# Patient Record
Sex: FEMALE | Race: BLACK OR AFRICAN AMERICAN | NOT HISPANIC OR LATINO | Employment: UNEMPLOYED | URBAN - METROPOLITAN AREA
[De-identification: names, ages, dates, MRNs, and addresses within clinical notes are randomized per-mention and may not be internally consistent; named-entity substitution may affect disease eponyms.]

---

## 2021-05-14 ENCOUNTER — IMMUNIZATION (OUTPATIENT)
Dept: PRIMARY CARE CLINIC | Facility: CLINIC | Age: 31
End: 2021-05-14

## 2021-05-14 DIAGNOSIS — Z23 NEED FOR VACCINATION: Primary | ICD-10-CM

## 2021-05-14 PROCEDURE — 0001A COVID-19, MRNA, LNP-S, PF, 30 MCG/0.3 ML DOSE VACCINE: ICD-10-PCS | Mod: CV19,S$GLB,, | Performed by: INTERNAL MEDICINE

## 2021-05-14 PROCEDURE — 0001A COVID-19, MRNA, LNP-S, PF, 30 MCG/0.3 ML DOSE VACCINE: CPT | Mod: CV19,S$GLB,, | Performed by: INTERNAL MEDICINE

## 2021-05-14 PROCEDURE — 91300 COVID-19, MRNA, LNP-S, PF, 30 MCG/0.3 ML DOSE VACCINE: CPT | Mod: S$GLB,,, | Performed by: INTERNAL MEDICINE

## 2021-05-14 PROCEDURE — 91300 COVID-19, MRNA, LNP-S, PF, 30 MCG/0.3 ML DOSE VACCINE: ICD-10-PCS | Mod: S$GLB,,, | Performed by: INTERNAL MEDICINE

## 2021-06-04 ENCOUNTER — IMMUNIZATION (OUTPATIENT)
Dept: PRIMARY CARE CLINIC | Facility: CLINIC | Age: 31
End: 2021-06-04

## 2021-06-04 DIAGNOSIS — Z23 NEED FOR VACCINATION: Primary | ICD-10-CM

## 2021-06-04 PROCEDURE — 0002A COVID-19, MRNA, LNP-S, PF, 30 MCG/0.3 ML DOSE VACCINE: ICD-10-PCS | Mod: CV19,S$GLB,, | Performed by: INTERNAL MEDICINE

## 2021-06-04 PROCEDURE — 91300 COVID-19, MRNA, LNP-S, PF, 30 MCG/0.3 ML DOSE VACCINE: CPT | Mod: S$GLB,,, | Performed by: INTERNAL MEDICINE

## 2021-06-04 PROCEDURE — 91300 COVID-19, MRNA, LNP-S, PF, 30 MCG/0.3 ML DOSE VACCINE: ICD-10-PCS | Mod: S$GLB,,, | Performed by: INTERNAL MEDICINE

## 2021-06-04 PROCEDURE — 0002A COVID-19, MRNA, LNP-S, PF, 30 MCG/0.3 ML DOSE VACCINE: CPT | Mod: CV19,S$GLB,, | Performed by: INTERNAL MEDICINE

## 2022-04-05 ENCOUNTER — HOSPITAL ENCOUNTER (EMERGENCY)
Facility: HOSPITAL | Age: 32
Discharge: HOME | End: 2022-04-05
Attending: EMERGENCY MEDICINE
Payer: MEDICAID

## 2022-04-05 VITALS
HEIGHT: 67 IN | WEIGHT: 138 LBS | RESPIRATION RATE: 16 BRPM | BODY MASS INDEX: 21.66 KG/M2 | TEMPERATURE: 99 F | SYSTOLIC BLOOD PRESSURE: 142 MMHG | DIASTOLIC BLOOD PRESSURE: 97 MMHG | HEART RATE: 94 BPM | OXYGEN SATURATION: 100 %

## 2022-04-05 DIAGNOSIS — S62.664A CLOSED NONDISPLACED FRACTURE OF DISTAL PHALANX OF RIGHT RING FINGER, INITIAL ENCOUNTER: ICD-10-CM

## 2022-04-05 DIAGNOSIS — S69.91XA INJURY OF FINGER OF RIGHT HAND, INITIAL ENCOUNTER: Primary | ICD-10-CM

## 2022-04-05 LAB
B-HCG UR QL: NEGATIVE
CTP QC/QA: YES

## 2022-04-05 PROCEDURE — 25000003 PHARM REV CODE 250: Performed by: PHYSICIAN ASSISTANT

## 2022-04-05 PROCEDURE — 99284 EMERGENCY DEPT VISIT MOD MDM: CPT | Mod: 25

## 2022-04-05 PROCEDURE — 81025 URINE PREGNANCY TEST: CPT | Performed by: EMERGENCY MEDICINE

## 2022-04-05 RX ORDER — SULFAMETHOXAZOLE AND TRIMETHOPRIM 800; 160 MG/1; MG/1
1 TABLET ORAL
Status: COMPLETED | OUTPATIENT
Start: 2022-04-05 | End: 2022-04-05

## 2022-04-05 RX ORDER — IBUPROFEN 800 MG/1
800 TABLET ORAL 3 TIMES DAILY
Qty: 20 TABLET | Refills: 0 | Status: SHIPPED | OUTPATIENT
Start: 2022-04-05

## 2022-04-05 RX ORDER — SULFAMETHOXAZOLE AND TRIMETHOPRIM 800; 160 MG/1; MG/1
1 TABLET ORAL 2 TIMES DAILY
Qty: 14 TABLET | Refills: 0 | Status: SHIPPED | OUTPATIENT
Start: 2022-04-05 | End: 2022-04-12

## 2022-04-05 RX ADMIN — SULFAMETHOXAZOLE AND TRIMETHOPRIM 1 TABLET: 800; 160 TABLET ORAL at 01:04

## 2022-04-05 NOTE — DISCHARGE INSTRUCTIONS
Take antibiotics until complete  Use ibuprofen as needed for pain  Follow up with Orthopedic Hand surgery  Return to the ED as needed  Keep the finger splint on the finger for the next couple of weeks    Thank you for coming to our Emergency Department today. It is important to remember that some problems are difficult to diagnose and may not be found during your Emergency Department visit. Be sure to follow up with your primary care doctor and review all labs/imaging/tests that were performed during this visit with them. Some labs/tests may be outside of the normal range and require non-emergent follow-up and further investigation to help diagnose/exclude/prevent complications or other medical conditions.    If you do not have a primary care doctor, you may contact the one listed on your discharge paperwork or you may also call the [facility] Appointment Desk at [phone] to schedule an appointment and establish care with one. It is important to your health that you have a primary care doctor.    Please take all medications as directed. All medications may potentially have side-effects and it is impossible to predict which medications may give you side-effects or what side-effects (if any) they will give you.. If you feel that you are having a negative effect or side-effect of any medication you should immediately stop taking them and seek medical attention. If you feel that you are having a life-threatening reaction call 911.    Return to the ER with any questions/concerns, new/concerning symptoms, worsening or failure to improve.     Do not drive, swim, climb to height, take a bath or make any important decisions for 24 hours if you have received any pain medications, sedatives or mood altering drugs during your ER visit.      Thank you for coming to our Emergency Department today. It is important to remember that some problems are difficult to diagnose and may not be found during your Emergency  Department visit. Be sure to follow up with your primary care doctor and review all labs/imaging/tests that were performed during this visit with them. Some labs/tests may be outside of the normal range and require non-emergent follow-up and further investigation to help diagnose/exclude/prevent complications or other medical conditions.    If you do not have a primary care doctor, you may contact the one listed on your discharge paperwork or you may also call the [facility] Appointment Desk at [phone] to schedule an appointment and establish care with one. It is important to your health that you have a primary care doctor.    Please take all medications as directed. All medications may potentially have side-effects and it is impossible to predict which medications may give you side-effects or what side-effects (if any) they will give you.. If you feel that you are having a negative effect or side-effect of any medication you should immediately stop taking them and seek medical attention. If you feel that you are having a life-threatening reaction call 911.    Return to the ER with any questions/concerns, new/concerning symptoms, worsening or failure to improve.     Do not drive, swim, climb to height, take a bath or make any important decisions for 24 hours if you have received any pain medications, sedatives or mood altering drugs during your ER visit.
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
## 2022-07-09 ENCOUNTER — HOSPITAL ENCOUNTER (EMERGENCY)
Facility: HOSPITAL | Age: 32
Discharge: HOME | End: 2022-07-09
Attending: EMERGENCY MEDICINE
Payer: MEDICAID

## 2022-07-09 VITALS
HEART RATE: 78 BPM | HEIGHT: 66 IN | OXYGEN SATURATION: 99 % | BODY MASS INDEX: 22.5 KG/M2 | RESPIRATION RATE: 20 BRPM | TEMPERATURE: 99 F | DIASTOLIC BLOOD PRESSURE: 85 MMHG | SYSTOLIC BLOOD PRESSURE: 132 MMHG | WEIGHT: 140 LBS

## 2022-07-09 DIAGNOSIS — J02.9 PHARYNGITIS, UNSPECIFIED ETIOLOGY: Primary | ICD-10-CM

## 2022-07-09 DIAGNOSIS — T17.908A FOREIGN BODY ASPIRATION: ICD-10-CM

## 2022-07-09 LAB
B-HCG UR QL: NEGATIVE
CTP QC/QA: YES

## 2022-07-09 PROCEDURE — 81025 URINE PREGNANCY TEST: CPT | Performed by: EMERGENCY MEDICINE

## 2022-07-09 PROCEDURE — 99284 EMERGENCY DEPT VISIT MOD MDM: CPT | Mod: 25

## 2022-07-09 PROCEDURE — 25000003 PHARM REV CODE 250: Performed by: EMERGENCY MEDICINE

## 2022-07-09 RX ORDER — SUCRALFATE 1 G/10ML
1 SUSPENSION ORAL 4 TIMES DAILY
Qty: 420 ML | Refills: 0 | Status: SHIPPED | OUTPATIENT
Start: 2022-07-09

## 2022-07-09 RX ORDER — MAG HYDROX/ALUMINUM HYD/SIMETH 200-200-20
30 SUSPENSION, ORAL (FINAL DOSE FORM) ORAL ONCE
Status: COMPLETED | OUTPATIENT
Start: 2022-07-09 | End: 2022-07-09

## 2022-07-09 RX ORDER — LIDOCAINE HYDROCHLORIDE 20 MG/ML
10 SOLUTION OROPHARYNGEAL ONCE
Status: COMPLETED | OUTPATIENT
Start: 2022-07-09 | End: 2022-07-09

## 2022-07-09 RX ADMIN — ALUMINUM HYDROXIDE, MAGNESIUM HYDROXIDE, AND SIMETHICONE 30 ML: 200; 200; 20 SUSPENSION ORAL at 09:07

## 2022-07-09 RX ADMIN — LIDOCAINE HYDROCHLORIDE 10 ML: 20 SOLUTION ORAL; TOPICAL at 09:07
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
## 2023-01-01 ENCOUNTER — HOSPITAL ENCOUNTER (EMERGENCY)
Facility: HOSPITAL | Age: 33
Discharge: HOME | End: 2023-01-01
Attending: EMERGENCY MEDICINE | Admitting: EMERGENCY MEDICINE
Payer: MEDICAID

## 2023-01-01 VITALS
TEMPERATURE: 98 F | HEIGHT: 66 IN | DIASTOLIC BLOOD PRESSURE: 82 MMHG | SYSTOLIC BLOOD PRESSURE: 134 MMHG | RESPIRATION RATE: 18 BRPM | BODY MASS INDEX: 22.18 KG/M2 | HEART RATE: 71 BPM | WEIGHT: 138 LBS | OXYGEN SATURATION: 99 %

## 2023-01-01 DIAGNOSIS — S16.1XXA CERVICAL STRAIN, ACUTE, INITIAL ENCOUNTER: ICD-10-CM

## 2023-01-01 DIAGNOSIS — S01.81XA LACERATION OF MULTIPLE SITES OF FACE: ICD-10-CM

## 2023-01-01 DIAGNOSIS — S02.30XA ORBITAL FLOOR (BLOW-OUT) CLOSED FRACTURE: Primary | ICD-10-CM

## 2023-01-01 DIAGNOSIS — S09.90XA CLOSED HEAD INJURY: ICD-10-CM

## 2023-01-01 LAB
B-HCG UR QL: NEGATIVE
CTP QC/QA: YES

## 2023-01-01 PROCEDURE — 81025 URINE PREGNANCY TEST: CPT | Performed by: EMERGENCY MEDICINE

## 2023-01-01 PROCEDURE — 25000003 PHARM REV CODE 250: Performed by: EMERGENCY MEDICINE

## 2023-01-01 PROCEDURE — 63600175 PHARM REV CODE 636 W HCPCS: Performed by: EMERGENCY MEDICINE

## 2023-01-01 PROCEDURE — 96365 THER/PROPH/DIAG IV INF INIT: CPT

## 2023-01-01 PROCEDURE — 96375 TX/PRO/DX INJ NEW DRUG ADDON: CPT

## 2023-01-01 PROCEDURE — 99285 EMERGENCY DEPT VISIT HI MDM: CPT | Mod: 25

## 2023-01-01 RX ORDER — ONDANSETRON 4 MG/1
4 TABLET, ORALLY DISINTEGRATING ORAL
Status: COMPLETED | OUTPATIENT
Start: 2023-01-01 | End: 2023-01-01

## 2023-01-01 RX ORDER — OXYCODONE AND ACETAMINOPHEN 5; 325 MG/1; MG/1
1 TABLET ORAL EVERY 6 HOURS PRN
Qty: 12 TABLET | Refills: 0 | Status: SHIPPED | OUTPATIENT
Start: 2023-01-01

## 2023-01-01 RX ORDER — OXYMETAZOLINE HCL 0.05 %
1 SPRAY, NON-AEROSOL (ML) NASAL 2 TIMES DAILY
Qty: 15 ML | Refills: 0 | Status: SHIPPED | OUTPATIENT
Start: 2023-01-01 | End: 2023-01-04

## 2023-01-01 RX ORDER — OXYCODONE HYDROCHLORIDE 5 MG/1
5 TABLET ORAL
Status: COMPLETED | OUTPATIENT
Start: 2023-01-01 | End: 2023-01-01

## 2023-01-01 RX ORDER — HYDROCODONE BITARTRATE AND ACETAMINOPHEN 5; 325 MG/1; MG/1
1 TABLET ORAL
Status: COMPLETED | OUTPATIENT
Start: 2023-01-01 | End: 2023-01-01

## 2023-01-01 RX ORDER — AMOXICILLIN AND CLAVULANATE POTASSIUM 875; 125 MG/1; MG/1
1 TABLET, FILM COATED ORAL 2 TIMES DAILY
Qty: 14 TABLET | Refills: 0 | Status: SHIPPED | OUTPATIENT
Start: 2023-01-01

## 2023-01-01 RX ORDER — ERYTHROMYCIN 5 MG/G
OINTMENT OPHTHALMIC
Qty: 3.5 G | Refills: 0 | Status: SHIPPED | OUTPATIENT
Start: 2023-01-01

## 2023-01-01 RX ORDER — CEFAZOLIN SODIUM 1 G/50ML
1 SOLUTION INTRAVENOUS
Status: DISCONTINUED | OUTPATIENT
Start: 2023-01-01 | End: 2023-01-01 | Stop reason: HOSPADM

## 2023-01-01 RX ORDER — HYDROMORPHONE HYDROCHLORIDE 1 MG/ML
1 INJECTION, SOLUTION INTRAMUSCULAR; INTRAVENOUS; SUBCUTANEOUS
Status: COMPLETED | OUTPATIENT
Start: 2023-01-01 | End: 2023-01-01

## 2023-01-01 RX ADMIN — HYDROCODONE BITARTRATE AND ACETAMINOPHEN 1 TABLET: 5; 325 TABLET ORAL at 08:01

## 2023-01-01 RX ADMIN — BACITRACIN ZINC, NEOMYCIN, POLYMYXIN B 1 EACH: 400; 3.5; 5 OINTMENT TOPICAL at 10:01

## 2023-01-01 RX ADMIN — CEFAZOLIN SODIUM 1 G: 1 SOLUTION INTRAVENOUS at 11:01

## 2023-01-01 RX ADMIN — OXYCODONE 5 MG: 5 TABLET ORAL at 03:01

## 2023-01-01 RX ADMIN — HYDROMORPHONE HYDROCHLORIDE 1 MG: 1 INJECTION, SOLUTION INTRAMUSCULAR; INTRAVENOUS; SUBCUTANEOUS at 12:01

## 2023-01-01 RX ADMIN — ONDANSETRON 4 MG: 4 TABLET, ORALLY DISINTEGRATING ORAL at 12:01

## 2023-01-01 NOTE — DISCHARGE INSTRUCTIONS
We recommend that you not blow your nose, do any heavy lifting bending or straining until you are cleared to do so by Ophthalmology  Please complete the entire course of antibiotics as prescribed:  Augmentin 875/125mg by mouth twice daily for 7 days   we recommend you use Afrin spray in both nostrils 2-3 times daily  for 3 days  We recommend you apply erythromycin antibiotic ointment to the small scrape underneath your left eye 3 times daily to help prevent infection and you may use it in your left eye as he would like for comfort  You may  apply ice packs to eyelids for 20 minutes every 1-2 hours for the first 24-48 hours   we recommend you have your head upright at a 30 degree angle whenever you are resting or sleeping  We recommend you avoid medications such as ibuprofen, Advil or aspirin.   You may use Percocet, 1-2 tabs every 6 hours as needed for pain.  Do not drink alcohol drive or operate heavy machinery while using Percocet.  Please know that Percocet does include Tylenol so do not take extra Tylenol while using Percocet.     Follow up with oculoplastics within the week - they should contact you at home for an appointment but you have been provided with their phone number if you do not hear from them this week    Return to the ED immediately for any new chest pain, shortness of breath, severe abdominal pain, abdominal pain that is constant, nausea/and vomiting that does not stop on its own, severe headache, new numbness, tingling, or weakness anywhere, fever greater than 101F or any additional concerns.
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
## 2023-01-01 NOTE — ED NOTES
LOC: The patient is awake, drowsy, and oriented to self, place, time, and situation. Pt is calm and cooperative. Affect is appropriate.  Speech is appropriate and clear.     APPEARANCE: Patient resting comfortably in no acute distress.  Patient is clean and well groomed.    SKIN: The skin is warm and dry; color consistent with ethnicity.  Patient has normal skin turgor and moist mucus membranes.  Edema noted to left eye, left side of upper and lower lip. Abrasion noted to right ear.     MUSCULOSKELETAL: Patient moving upper and lower extremities without difficulty; denies pain in the extremities or back.  Denies weakness.     RESPIRATORY: Airway is open and patent. Respirations spontaneous, even, easy, and non-labored.  Patient has a normal effort and rate.  No accessory muscle use noted. Denies cough.     CARDIAC:  Normal rate noted.  No peripheral edema noted. No complaints of chest pain.      ABDOMEN: Soft and non tender to palpation.  No distention noted. Pt denies abdominal pain; denies nausea, vomiting, diarrhea, or constipation.    NEUROLOGIC: Eye open spontaneously.  Behavior appropriate to situation.  Follows commands; facial expression symmetrical.  Purposeful motor response noted; normal sensation in all extremities. Pt denies headache; denies lightheadedness or dizziness; denies visual disturbances; denies loss of balance; denies unilateral weakness.PERRLA intact with appx 3mm pupils bilaterally.

## 2023-01-03 ENCOUNTER — TELEPHONE (OUTPATIENT)
Dept: OPHTHALMOLOGY | Facility: CLINIC | Age: 33
End: 2023-01-03
Payer: MEDICAID

## 2023-01-03 NOTE — TELEPHONE ENCOUNTER
----- Message from Dean Salazar MD sent at 1/1/2023  3:53 PM CST -----  Regarding: Follow up  Hi,    Can we please get this patient follow up in [facility]'s clinic at the end of this week or early next week for follow up of orbital fracture.    Thanks!    Best Contact: [phone]    Dean Salazar MD  [facility], PGY-2  Pager: [phone]

## 2023-02-05 ENCOUNTER — HOSPITAL ENCOUNTER (EMERGENCY)
Facility: HOSPITAL | Age: 33
Discharge: HOME | End: 2023-02-05
Attending: EMERGENCY MEDICINE
Payer: MEDICAID

## 2023-02-05 VITALS
HEIGHT: 67 IN | OXYGEN SATURATION: 95 % | RESPIRATION RATE: 16 BRPM | SYSTOLIC BLOOD PRESSURE: 111 MMHG | HEART RATE: 102 BPM | TEMPERATURE: 98 F | DIASTOLIC BLOOD PRESSURE: 76 MMHG | WEIGHT: 138 LBS | BODY MASS INDEX: 21.66 KG/M2

## 2023-02-05 DIAGNOSIS — B96.89 BV (BACTERIAL VAGINOSIS): ICD-10-CM

## 2023-02-05 DIAGNOSIS — N39.0 URINARY TRACT INFECTION WITHOUT HEMATURIA, SITE UNSPECIFIED: Primary | ICD-10-CM

## 2023-02-05 DIAGNOSIS — N76.0 BV (BACTERIAL VAGINOSIS): ICD-10-CM

## 2023-02-05 LAB
B-HCG UR QL: NEGATIVE
BACTERIA #/AREA URNS HPF: ABNORMAL /HPF
BACTERIA GENITAL QL WET PREP: ABNORMAL
BILIRUB UR QL STRIP: NEGATIVE
CLARITY UR: CLEAR
CLUE CELLS VAG QL WET PREP: ABNORMAL
COLOR UR: YELLOW
CTP QC/QA: YES
FILAMENT FUNGI VAG WET PREP-#/AREA: ABNORMAL
GLUCOSE UR QL STRIP: NEGATIVE
HGB UR QL STRIP: ABNORMAL
HYALINE CASTS #/AREA URNS LPF: 1 /LPF
KETONES UR QL STRIP: NEGATIVE
LEUKOCYTE ESTERASE UR QL STRIP: ABNORMAL
MICROSCOPIC COMMENT: ABNORMAL
NITRITE UR QL STRIP: POSITIVE
PH UR STRIP: 6 [PH] (ref 5–8)
PROT UR QL STRIP: ABNORMAL
RBC #/AREA URNS HPF: 10 /HPF (ref 0–4)
SP GR UR STRIP: 1.02 (ref 1–1.03)
SPECIMEN SOURCE: ABNORMAL
SQUAMOUS #/AREA URNS HPF: 0 /HPF
T VAGINALIS GENITAL QL WET PREP: ABNORMAL
URN SPEC COLLECT METH UR: ABNORMAL
UROBILINOGEN UR STRIP-ACNC: NEGATIVE EU/DL
WBC #/AREA URNS HPF: 67 /HPF (ref 0–5)
WBC #/AREA VAG WET PREP: ABNORMAL
YEAST GENITAL QL WET PREP: ABNORMAL

## 2023-02-05 PROCEDURE — 87210 SMEAR WET MOUNT SALINE/INK: CPT | Performed by: NURSE PRACTITIONER

## 2023-02-05 PROCEDURE — 99284 EMERGENCY DEPT VISIT MOD MDM: CPT

## 2023-02-05 PROCEDURE — 81000 URINALYSIS NONAUTO W/SCOPE: CPT

## 2023-02-05 PROCEDURE — 87086 URINE CULTURE/COLONY COUNT: CPT

## 2023-02-05 PROCEDURE — 87088 URINE BACTERIA CULTURE: CPT

## 2023-02-05 PROCEDURE — 87591 N.GONORRHOEAE DNA AMP PROB: CPT | Performed by: NURSE PRACTITIONER

## 2023-02-05 PROCEDURE — 81025 URINE PREGNANCY TEST: CPT

## 2023-02-05 PROCEDURE — 87186 SC STD MICRODIL/AGAR DIL: CPT

## 2023-02-05 PROCEDURE — 87077 CULTURE AEROBIC IDENTIFY: CPT

## 2023-02-05 RX ORDER — PHENAZOPYRIDINE HYDROCHLORIDE 200 MG/1
200 TABLET, FILM COATED ORAL 3 TIMES DAILY PRN
Qty: 9 TABLET | Refills: 0 | Status: SHIPPED | OUTPATIENT
Start: 2023-02-05 | End: 2023-02-15

## 2023-02-05 RX ORDER — METRONIDAZOLE 7.5 MG/G
1 GEL VAGINAL NIGHTLY
Qty: 70 G | Refills: 0 | Status: SHIPPED | OUTPATIENT
Start: 2023-02-05 | End: 2023-02-10

## 2023-02-05 RX ORDER — NITROFURANTOIN 25; 75 MG/1; MG/1
100 CAPSULE ORAL 2 TIMES DAILY
Qty: 10 CAPSULE | Refills: 0 | Status: SHIPPED | OUTPATIENT
Start: 2023-02-05 | End: 2023-02-10

## 2023-02-05 RX ORDER — NITROFURANTOIN 25; 75 MG/1; MG/1
100 CAPSULE ORAL 2 TIMES DAILY
Qty: 10 CAPSULE | Refills: 0 | Status: SHIPPED | OUTPATIENT
Start: 2023-02-05 | End: 2023-02-05 | Stop reason: SDUPTHER

## 2023-02-05 NOTE — ED PROVIDER NOTES
"Encounter Date: 2/5/2023       History     Chief Complaint   Patient presents with    Vaginal Discharge     Patient is a 32yo female that is having vaginal discharge and foul smelling urine x1 month.      33-year-old female presents with vaginal discharge and dysuria x1 month.  Patient states that she is concerned about an STD and is requesting "just a shot"  patient denies fever, vomiting, abdominal pain or any other associated symptoms.  Patient has not sought treatment previously for these symptoms.    Review of patient's allergies indicates:  No Known Allergies  History reviewed. No pertinent past medical history.  History reviewed. No pertinent surgical history.  History reviewed. No pertinent family history.  Social History     Tobacco Use    Smoking status: Every Day     Types: Cigarettes    Smokeless tobacco: Never   Substance Use Topics    Alcohol use: Yes    Drug use: Yes     Types: Cocaine, Marijuana     Review of Systems   Constitutional:  Negative for fever.   HENT:  Negative for sore throat.    Respiratory:  Negative for shortness of breath.    Cardiovascular:  Negative for chest pain.   Gastrointestinal:  Negative for nausea.   Genitourinary:  Positive for dysuria and vaginal discharge. Negative for flank pain.   Musculoskeletal:  Negative for back pain.   Skin:  Negative for rash.   Neurological:  Negative for weakness.   Hematological:  Does not bruise/bleed easily.     Physical Exam     Initial Vitals [02/05/23 1212]   BP Pulse Resp Temp SpO2   111/76 102 16 98.1 °F (36.7 °C) 95 %      MAP       --         Physical Exam    Nursing note and vitals reviewed.  Constitutional: She appears well-developed and well-nourished.   HENT:   Head: Normocephalic.   Eyes: Conjunctivae are normal.   Neck: Neck supple.   Normal range of motion.  Musculoskeletal:         General: Normal range of motion.      Cervical back: Normal range of motion and neck supple.     Neurological: She is alert and oriented to person, " "place, and time. GCS score is 15. GCS eye subscore is 4. GCS verbal subscore is 5. GCS motor subscore is 6.   Skin: Skin is warm and dry. Capillary refill takes less than 2 seconds.   Psychiatric: She has a normal mood and affect.   Responses somewhat retarded       ED Course   Procedures  Labs Reviewed   VAGINAL SCREEN - Abnormal; Notable for the following components:       Result Value    Clue Cells Few (*)     Bacteria - Vaginal Screen Few (*)     All other components within normal limits    Narrative:     Release to patient->Immediate   URINALYSIS, REFLEX TO URINE CULTURE - Abnormal; Notable for the following components:    Protein, UA Trace (*)     Occult Blood UA 1+ (*)     Nitrite, UA Positive (*)     Leukocytes, UA 2+ (*)     All other components within normal limits    Narrative:     Specimen Source->Urine   URINALYSIS MICROSCOPIC - Abnormal; Notable for the following components:    RBC, UA 10 (*)     WBC, UA 67 (*)     Bacteria Many (*)     All other components within normal limits    Narrative:     Specimen Source->Urine   C. TRACHOMATIS/N. GONORRHOEAE BY AMP DNA   CULTURE, URINE   POCT URINE PREGNANCY          Imaging Results    None          Medications - No data to display  Medical Decision Making:   Differential Diagnosis:   UTI, STI, fear complaint  ED Management:  Diagnosis management comments: This is an urgent evaluation of a 33-year-old female that presented to the ER with c/o vaginal discharge and foul-smelling urine x1 month. Pts exam was as above.     Labs were reviewed and discussed with pt.     While discussing findings with patient she continued to doze off.  I asked her if she was using any sedating substances.  She states "no I am just tired".  Patient is arousable to verbal and I believe she is safe for discharge in outpatient follow-up.  I will not treat empirically today secondary to the fact that we are treating a UTI and her bacterial vaginosis.  She is been instructed to abstain " from sexual contacts until all her labs have returned and she is been completely treated.    Based on exam today - I have low suspicion for medical, surgical or other life threatening condition and I believe pt is safe for discharge and outpatient f/u.    Pt verbalizes understanding of d/c instructions and will return for worsening condition.                              Clinical Impression:   Final diagnoses:  [N39.0] Urinary tract infection without hematuria, site unspecified (Primary)  [N76.0, B96.89] BV (bacterial vaginosis)        ED Disposition Condition    Discharge Stable          ED Prescriptions       Medication Sig Dispense Start Date End Date Auth. Provider    metroNIDAZOLE (METROGEL) 0.75 % (37.5mg/5 gram) vaginal gel Place 1 applicator vaginally every evening. for 5 days 70 g 2/5/2023 2/10/2023 Terese Woodward NP    nitrofurantoin, macrocrystal-monohydrate, (MACROBID) 100 MG capsule  (Status: Discontinued) Take 1 capsule (100 mg total) by mouth 2 (two) times daily. for 5 days 10 capsule 2/5/2023 2/5/2023 Terese Woodward NP    phenazopyridine (PYRIDIUM) 200 MG tablet Take 1 tablet (200 mg total) by mouth 3 (three) times daily as needed for Pain. 9 tablet 2/5/2023 2/15/2023 Terese Woodward NP    nitrofurantoin, macrocrystal-monohydrate, (MACROBID) 100 MG capsule Take 1 capsule (100 mg total) by mouth 2 (two) times daily. for 5 days 10 capsule 2/5/2023 2/10/2023 Terese Woodward NP          Follow-up Information       Follow up With Specialties Details Why Contact Info    [facility]  Schedule an appointment as soon as possible for a visit   [address] [place] [address]  [phone]      [facility] - Emergency Dept Emergency Medicine  If symptoms worsen or any other concerns [address]  [facility] [address] [phone]             Terese Woodward NP  02/05/23 8642

## 2023-02-07 LAB
BACTERIA UR CULT: ABNORMAL
C TRACH DNA SPEC QL NAA+PROBE: NOT DETECTED
N GONORRHOEA DNA SPEC QL NAA+PROBE: NOT DETECTED

## 2023-04-11 ENCOUNTER — PATIENT MESSAGE (OUTPATIENT)
Dept: RESEARCH | Facility: HOSPITAL | Age: 33
End: 2023-04-11
Payer: MEDICAID

## 2023-11-22 ENCOUNTER — HOSPITAL ENCOUNTER (EMERGENCY)
Facility: HOSPITAL | Age: 33
Discharge: HOME | End: 2023-11-22
Attending: STUDENT IN AN ORGANIZED HEALTH CARE EDUCATION/TRAINING PROGRAM
Payer: MEDICAID

## 2023-11-22 VITALS
HEART RATE: 75 BPM | RESPIRATION RATE: 18 BRPM | BODY MASS INDEX: 20.89 KG/M2 | DIASTOLIC BLOOD PRESSURE: 70 MMHG | WEIGHT: 130 LBS | OXYGEN SATURATION: 100 % | TEMPERATURE: 99 F | SYSTOLIC BLOOD PRESSURE: 113 MMHG | HEIGHT: 66 IN

## 2023-11-22 DIAGNOSIS — R07.9 CHEST PAIN: ICD-10-CM

## 2023-11-22 DIAGNOSIS — T18.9XXA SWALLOWED FOREIGN BODY: ICD-10-CM

## 2023-11-22 LAB
ALBUMIN SERPL BCP-MCNC: 3.5 G/DL (ref 3.5–5.2)
ALP SERPL-CCNC: 37 U/L (ref 55–135)
ALT SERPL W/O P-5'-P-CCNC: 15 U/L (ref 10–44)
ANION GAP SERPL CALC-SCNC: 3 MMOL/L (ref 8–16)
AST SERPL-CCNC: 14 U/L (ref 10–40)
B-HCG UR QL: NEGATIVE
BASOPHILS # BLD AUTO: 0.06 K/UL (ref 0–0.2)
BASOPHILS NFR BLD: 0.8 % (ref 0–1.9)
BILIRUB SERPL-MCNC: 0.4 MG/DL (ref 0.1–1)
BUN SERPL-MCNC: 7 MG/DL (ref 6–20)
CALCIUM SERPL-MCNC: 8.7 MG/DL (ref 8.7–10.5)
CHLORIDE SERPL-SCNC: 104 MMOL/L (ref 95–110)
CO2 SERPL-SCNC: 29 MMOL/L (ref 23–29)
CREAT SERPL-MCNC: 0.8 MG/DL (ref 0.5–1.4)
CTP QC/QA: YES
DIFFERENTIAL METHOD: ABNORMAL
EOSINOPHIL # BLD AUTO: 0.3 K/UL (ref 0–0.5)
EOSINOPHIL NFR BLD: 3.7 % (ref 0–8)
ERYTHROCYTE [DISTWIDTH] IN BLOOD BY AUTOMATED COUNT: 13.2 % (ref 11.5–14.5)
EST. GFR  (NO RACE VARIABLE): >60 ML/MIN/1.73 M^2
GLUCOSE SERPL-MCNC: 97 MG/DL (ref 70–110)
HCT VFR BLD AUTO: 34 % (ref 37–48.5)
HGB BLD-MCNC: 11 G/DL (ref 12–16)
IMM GRANULOCYTES # BLD AUTO: 0.02 K/UL (ref 0–0.04)
IMM GRANULOCYTES NFR BLD AUTO: 0.3 % (ref 0–0.5)
LYMPHOCYTES # BLD AUTO: 1.5 K/UL (ref 1–4.8)
LYMPHOCYTES NFR BLD: 20.1 % (ref 18–48)
MCH RBC QN AUTO: 29.1 PG (ref 27–31)
MCHC RBC AUTO-ENTMCNC: 32.4 G/DL (ref 32–36)
MCV RBC AUTO: 90 FL (ref 82–98)
MONOCYTES # BLD AUTO: 0.6 K/UL (ref 0.3–1)
MONOCYTES NFR BLD: 8.5 % (ref 4–15)
NEUTROPHILS # BLD AUTO: 4.9 K/UL (ref 1.8–7.7)
NEUTROPHILS NFR BLD: 66.6 % (ref 38–73)
NRBC BLD-RTO: 0 /100 WBC
PLATELET # BLD AUTO: 204 K/UL (ref 150–450)
PMV BLD AUTO: 8.8 FL (ref 9.2–12.9)
POC MOLECULAR INFLUENZA A AGN: NEGATIVE
POC MOLECULAR INFLUENZA B AGN: NEGATIVE
POCT GLUCOSE: 94 MG/DL (ref 70–110)
POTASSIUM SERPL-SCNC: 4.1 MMOL/L (ref 3.5–5.1)
PROT SERPL-MCNC: 6.4 G/DL (ref 6–8.4)
RBC # BLD AUTO: 3.78 M/UL (ref 4–5.4)
SARS-COV-2 RDRP RESP QL NAA+PROBE: NEGATIVE
SODIUM SERPL-SCNC: 136 MMOL/L (ref 136–145)
WBC # BLD AUTO: 7.32 K/UL (ref 3.9–12.7)

## 2023-11-22 PROCEDURE — 93010 EKG 12-LEAD: ICD-10-PCS | Mod: ,,, | Performed by: INTERNAL MEDICINE

## 2023-11-22 PROCEDURE — 80053 COMPREHEN METABOLIC PANEL: CPT | Performed by: STUDENT IN AN ORGANIZED HEALTH CARE EDUCATION/TRAINING PROGRAM

## 2023-11-22 PROCEDURE — 81025 URINE PREGNANCY TEST: CPT | Performed by: STUDENT IN AN ORGANIZED HEALTH CARE EDUCATION/TRAINING PROGRAM

## 2023-11-22 PROCEDURE — 93005 ELECTROCARDIOGRAM TRACING: CPT

## 2023-11-22 PROCEDURE — 82962 GLUCOSE BLOOD TEST: CPT

## 2023-11-22 PROCEDURE — 85025 COMPLETE CBC W/AUTO DIFF WBC: CPT | Performed by: STUDENT IN AN ORGANIZED HEALTH CARE EDUCATION/TRAINING PROGRAM

## 2023-11-22 PROCEDURE — 87635 SARS-COV-2 COVID-19 AMP PRB: CPT | Performed by: STUDENT IN AN ORGANIZED HEALTH CARE EDUCATION/TRAINING PROGRAM

## 2023-11-22 PROCEDURE — 93010 ELECTROCARDIOGRAM REPORT: CPT | Mod: ,,, | Performed by: INTERNAL MEDICINE

## 2023-11-22 PROCEDURE — 87502 INFLUENZA DNA AMP PROBE: CPT

## 2023-11-22 PROCEDURE — 99285 EMERGENCY DEPT VISIT HI MDM: CPT | Mod: 25

## 2023-11-22 NOTE — ED PROVIDER NOTES
"Encounter Date: 11/22/2023    SCRIBE #1 NOTE: I, Macy Murphy, am scribing for, and in the presence of,  Catalina Davis DO. I have scribed the following portions of the note - Other sections scribed: HPI, ROS, PE, MDM.       History     Chief Complaint   Patient presents with    Swallowed Foreign Body     Pt to the ED via EMS with complaints of possibly swallowing a small piece of glass from a drinking glass last night. Pt now reports left rib pain since this morning, denies abd pain, N/V. Pt also states "I think I have covid, I can't smell anything". Reports sore throat and congestion. Pt appears very lethargic, falling asleep during triage.      CC: swallowing foreign body    HPI: This is a 33 y.o.female patient, with a PMHx of Anemia, presenting to the ED via EMS for further evaluation of swallowing foreign body. Patient states she may have possibly swallowed a small piece of glass from a broken drinking glass last night. Patient states it might also possibly be a whitening strip. Patient reports associated symptoms of left rib pain ( rates 10/10), nausea, vomiting, subjective fever, cough, and congestion. Patient endorses to drinking a glass of liquor last night. Patient reports she took Tylenol Cold and Flu for relief. Per nurse, patient reports she did do heroin and cocaine yesterday and took Nyquil pta today. Patient reports history of HTN and states she has not taken her BP in a while. Patient reports concerns of having COVID and states, ' I can't taste or smell anything." Patient denies any hematemesis. Patient denies any shortness of breath, chest pain, abdominal pain, dysuria, or any other associated symptoms. No alleviating or aggravating factors. No known drug allergies. In addition, the patient also reports no suicidal or homicidal ideations, no auditory or visual hallucinations.        The history is provided by the patient. No  was used.     Review of patient's " allergies indicates:  No Known Allergies  No past medical history on file.  No past surgical history on file.  No family history on file.  Social History     Tobacco Use    Smoking status: Every Day     Types: Cigarettes    Smokeless tobacco: Never   Substance Use Topics    Alcohol use: Yes    Drug use: Yes     Types: Cocaine, Marijuana     Review of Systems   Constitutional:  Positive for fever. Negative for chills.        (+) foreign body ingestion   HENT:  Positive for congestion. Negative for drooling, facial swelling and trouble swallowing.    Eyes:  Negative for redness and visual disturbance.   Respiratory:  Positive for cough. Negative for shortness of breath and stridor.    Cardiovascular:  Negative for chest pain.   Gastrointestinal:  Positive for nausea and vomiting. Negative for abdominal pain.   Genitourinary:  Negative for dysuria and hematuria.   Musculoskeletal:  Positive for arthralgias (left rib pain). Negative for gait problem and neck stiffness.   Skin:  Negative for pallor and wound.   Neurological:  Negative for syncope, facial asymmetry, speech difficulty and weakness.   Psychiatric/Behavioral:  Negative for confusion.    All other systems reviewed and are negative.      Physical Exam     Initial Vitals [11/22/23 1424]   BP Pulse Resp Temp SpO2   122/80 89 18 98.5 °F (36.9 °C) 98 %      MAP       --         Physical Exam    Nursing note and vitals reviewed.  Constitutional: Vital signs are normal. She appears well-developed. She is not diaphoretic.  Non-toxic appearance. She does not have a sickly appearance. She does not appear ill.   Sleepy but arousable with verbal stimuli   HENT:   Head: Normocephalic and atraumatic.   Right Ear: External ear normal.   Left Ear: External ear normal.   Mouth/Throat: Uvula is midline, oropharynx is clear and moist and mucous membranes are normal. Dental caries present.   Eyes: Conjunctivae and EOM are normal. Pupils are equal, round, and reactive to light.  No scleral icterus.   Neck: Neck supple. No JVD present.   Normal range of motion.  Cardiovascular:  Normal rate, regular rhythm, normal heart sounds and intact distal pulses.           Pulmonary/Chest: Breath sounds normal. No stridor. No respiratory distress.   Patient is able to speak in full clear sentences. There is no stridor. Patient is able to tolerate secretions.    Abdominal: Abdomen is soft. She exhibits no distension. There is no abdominal tenderness. There is no rebound and no guarding.   Musculoskeletal:         General: Tenderness and edema present. Normal range of motion.      Cervical back: Normal range of motion and neck supple. No rigidity. No spinous process tenderness.      Comments: +1 non-pitting edema to bilateral lower extremities extending to ankles and feet. Tenderness to palpation of left lateral rib cage. There are no overlying skin changes.      Neurological: She is alert. She has normal strength. She displays no atrophy and no tremor. No sensory deficit. She exhibits normal muscle tone. She displays no seizure activity. Gait normal.   Moves all extremities, follows all commands, no focal neurologic deficits.      Skin: Skin is warm and dry. No rash noted. No erythema.   Psychiatric: She has a normal mood and affect. She is not actively hallucinating. She expresses no homicidal and no suicidal ideation.         ED Course   Procedures  Labs Reviewed   CBC W/ AUTO DIFFERENTIAL - Abnormal; Notable for the following components:       Result Value    RBC 3.78 (*)     Hemoglobin 11.0 (*)     Hematocrit 34.0 (*)     MPV 8.8 (*)     All other components within normal limits   COMPREHENSIVE METABOLIC PANEL - Abnormal; Notable for the following components:    Alkaline Phosphatase 37 (*)     Anion Gap 3 (*)     All other components within normal limits   POCT URINE PREGNANCY   SARS-COV-2 RDRP GENE   POCT INFLUENZA A/B MOLECULAR   POCT GLUCOSE     EKG Readings: (Independently Interpreted)   EKG  obtained at 3:24 p.m. shows normal sinus rhythm nonspecific T-wave inversions in leads 2 3 AVF.  No obvious reciprocal changes.  Baseline artifact in V1.  No acute ST segment changes.  Baseline artifact in V1 new when compared to previous EKG from January 2023.  EKG otherwise grossly unchanged.     ECG Results              EKG 12-lead (Final result)  Result time 11/24/23 06:52:33      Final result by Interface, [facility] (11/24/23 06:52:33)                   Narrative:    Test Reason : R07.9,    Vent. Rate : 062 BPM     Atrial Rate : 062 BPM     P-R Int : 138 ms          QRS Dur : 090 ms      QT Int : 374 ms       P-R-T Axes : 056 -15 -09 degrees     QTc Int : 379 ms    Normal sinus rhythm  Nonspecific T wave abnormality  Abnormal ECG  When compared with ECG of 01-JAN-2023 13:01,  Significant changes have occurred  Confirmed by Aletha WINSTON, Rocky SINGH ([id number]) on 11/24/2023 6:52:24 AM    Referred By: [email or web address]   SELF           Confirmed By:Rocky Pompa MD                                     EKG 12-LEAD (Final result)  Result time 11/24/23 13:52:19      Final result by Unknown User (11/24/23 13:52:19)                                      Imaging Results              X-Ray Chest AP Portable (Final result)  Result time 11/22/23 15:04:36      Final result by Prosper Ellis MD (11/22/23 15:04:36)                   Impression:      No acute intrathoracic process.  No radiopaque foreign body identified.      Electronically signed by: Prosper Ellis MD  Date:    11/22/2023  Time:    15:04               Narrative:    EXAMINATION:  XR CHEST AP PORTABLE    CLINICAL HISTORY:  Foreign body of alimentary tract, part unspecified, initial encounter    TECHNIQUE:  Single frontal view of the chest was performed.    COMPARISON:  07/09/2022.    FINDINGS:  The trachea is unremarkable.  The cardiomediastinal silhouette is within normal limits.  The hilar structures are unremarkable.  There is no evidence of free air beneath the  hemidiaphragms.  There are no pleural effusions.  There is no evidence of a pneumothorax.  There is no evidence of pneumomediastinum.  No airspace opacity is present.  No radiopaque foreign body is identified.  The osseous structures are unremarkable.                                       Medications - No data to display  Medical Decision Making   MDM  This is an emergent evaluation of a 33 y.o.  with a PMHx of Anemia, presenting to the ED via EMS for further evaluation of swallowing foreign body. Patient states she may have possibly swallowed a small piece of glass from a broken drinking glass last night. Initial vitals in the ED  [11/22/23 1424]  BP: 122/80  Pulse: 89  Resp: 18  Temp: 98.5 °F (36.9 °C)  SpO2: 98 % .     Physical exam noted above. DDx includes but is not limited to substance use, swallowing foreign body, COVID vs Influenza vs other viral illness. Also considered but clinically less likely to be dissection, ACS, PE. Will obtain labs and imaging including CMP, CBC, POCT Influenza, SARS-COV-2, POCT Glucose, and UPT. Will continue to monitor and frequently reassess pending results of labs, treatments and final disposition.    Patient is aware of plan and is amenable.     Catalina Davis D.O  EMERGENCY MEDICINE  3:04 PM 11/22/2023    UPDATE  Labs reveal a stable normocytic anemia. Remainder of the labs are unremarkable. Chest xray shows no acute abnormality, including no radiopaque foreign body. EKG shows no acute STEMI. On reassessment, the patient symptoms were improved. She also further endorsed recent heroin use. Patient does not appear to be a danger to herself and not currently meeting PEC criteria. She is more alert and able to contact a friend to pick her up. Given benign exam and work-up, symptoms less likely due to a life threatening cause at this time. Will discharge with symptomatic treatment at home, PCP follow-up and ED return precautions. Patient aware and agreeable to the plan.      Catalina Davis D.O  EMERGENCY MEDICINE   6:16 PM 11/22/2023       This chart was completed using dictation software, as a result there may be some transcription errors      Amount and/or Complexity of Data Reviewed  External Data Reviewed: labs, radiology, ECG and notes.  Labs: ordered. Decision-making details documented in ED Course.  Radiology: ordered and independent interpretation performed. Decision-making details documented in ED Course.  ECG/medicine tests: ordered and independent interpretation performed. Decision-making details documented in ED Course.    Risk  OTC drugs.            Scribe Attestation:   Scribe #1: I performed the above scribed service and the documentation accurately describes the services I performed. I attest to the accuracy of the note.                            Clinical Impression:  Final diagnoses:  [T18.9XXA] Swallowed foreign body  [R07.9] Chest pain          ED Disposition Condition    Discharge Stable          ED Prescriptions    None       Follow-up Information       Follow up With Specialties Details Why Contact Info    Your primary care physician  Schedule an appointment as soon as possible for a visit in 3 days Emergency Room Follow-up     [facility] Emergency Dept Emergency Medicine Go to  If symptoms worsen [address] [facility] [place] [id number] [phone]    [facility] -  Schedule an appointment as soon as possible for a visit in 3 days Emergency Room Follow-up, to follow-up with the primary care physician if you do not have one [address] [place] [address]  [phone]            I, Catalina Davis, DO, personally performed the services described in this documentation. All medical record entries made by the scribe were at my direction and in my presence. I have reviewed the chart and agree that the record reflects my personal performance and is accurate and complete.        Catalina Davis, DO  11/30/23 7722

## 2023-11-22 NOTE — ED TRIAGE NOTES
Pt presents to ED with complaint of swallowing a piece of class from a drinking glass x1 year ago. Pt states that she thinks the glass was logged in her gums for a year and dislodged yesterday and she swallowed it. Pt is currently complaining of abdominal pain. Pt also thinks she has covid and is complaining of nasal congestion, cough, and fatigue x1 week. Pt is very lethargic when asked questions. Pt states she took nightquil pta. Pt has history of heroin and cocaine use.

## 2023-11-23 NOTE — DISCHARGE INSTRUCTIONS
Thank you for coming to our Emergency Department today. It is important to remember that some problems or medical conditions are difficult to diagnose and may not be found during your Emergency Department visit.     Be sure to follow up with your primary care doctor and review all labs/imaging/tests that were performed during your ER visit with them. Some labs/tests may be outside of the normal range and require non-emergent follow-up and further investigation to help diagnose/exclude/prevent complications or other potentially serious medical conditions that were not addressed during your ER visit.    If you do not have a primary care doctor, you may contact the one listed on your discharge paperwork or you may also call the [facility] Appointment Desk at [phone] to schedule an appointment and establish care with one. It is important to your health that you have a primary care doctor.    Please take all medications as directed. All medications may potentially have side-effects and it is impossible to predict which medications may give you side-effects or what side-effects (if any) they will give you.. If you feel that you are having a negative effect or side-effect of any medication you should immediately stop taking them and seek medical attention. If you feel that you are having a life-threatening reaction call 911.    Return to the ER with any questions/concerns, new/concerning symptoms, worsening or failure to improve.     Do not drive, swim, climb to height, take a bath, operate heavy machinery, drink alcohol or take potentially sedating medications, sign any legal documents or make any important decisions for 24 hours if you have received any pain medications, sedatives or mood altering drugs during your ER visit or within 24 hours of taking them if they have been prescribed to you.     You can find additional resources for Dentists, hearing aids, durable medical equipment, low cost pharmacies and  other resources at https://[facility].org    BELOW THIS LINE ONLY APPLIES IF YOU HAVE A COVID TEST PENDING OR IF YOU HAVE BEEN DIAGNOSED WITH COVID:  Please access [facility] to review the results of your test. Until the results of your COVID test return, you should isolate yourself so as not to potentially spread illness to others.   If your COVID test returns positive, you should isolate yourself so as not to spread illness to others. After five full days, if you are feeling better and you have not had fever for 24 hours, you can return to your typical daily activities, but you must wear a mask around others for an additional 5 days.   If your COVID test returns negative and you are either unvaccinated or more than six months out from your two-dose vaccine and are not yet boosted, you should still quarantine for 5 full days followed by strict mask use for an additional 5 full days.   If your COVID test returns negative and you have received your 2-dose initial vaccine as well as a booster, you should continue strict mask use for 10 full days after the exposure.  For all those exposed, best practice includes a test at day 5 after the exposure. This can be a home test or a test through one of the many testing centers throughout our community.   Masking is always advised to limit the spread of COVID. Cdc.gov is an excellent site to obtain the latest up to date recommendations regarding COVID and COVID testing.     CDC Testing and Quarantine Guidelines for patients with exposure to a known-positive COVID-19 person:  A close exposure is defined as anyone who has had an exposure (masked or unmasked) to a known COVID -19 positive person within 6 feet of someone for a cumulative total of 15 minutes or more over a 24-hour period.   Vaccinated and/or if you recently had a positive covid test within 90 days do NOT need to quarantine after contact with someone who had COVID-19 unless you develop symptoms.   Fully vaccinated  people who have not had a positive test within 90 days, should get tested 3-5 days after their exposure, even if they don't have symptoms and wear a mask indoors in public for 14 days following exposure or until their test result is negative.      Unvaccinated and/or NOT had a positive test within 90 days and meet close exposure  You are required by CDC guidelines to quarantine for at least 5 days from time of exposure followed by 5 days of strict masking. It is recommended, but not required to test after 5 days, unless you develop symptoms, in which case you should test at that time.  If you get tested after 5 days and your test is positive, your 5 day period of isolation starts the day of the positive test.    If your exposure does not meet the above definition, you can return to your normal daily activities to include social distancing, wearing a mask and frequent handwashing.      Here is a link to guidance from the CDC:  https://www.cdc.gov/media/releases/2021/s1227-isolation-quarantine-guidance.html      [place] Dept Of Health Testing Sites:  https://ldh.[place].gov/page/3934      [facility] website with testing locations and guidance:  [email or web address] [email or web address]

## 2023-12-30 ENCOUNTER — HOSPITAL ENCOUNTER (EMERGENCY)
Facility: HOSPITAL | Age: 33
Discharge: HOME | End: 2023-12-30
Attending: EMERGENCY MEDICINE
Payer: MEDICAID

## 2023-12-30 VITALS
WEIGHT: 138 LBS | HEART RATE: 88 BPM | SYSTOLIC BLOOD PRESSURE: 125 MMHG | HEIGHT: 68 IN | RESPIRATION RATE: 20 BRPM | DIASTOLIC BLOOD PRESSURE: 86 MMHG | TEMPERATURE: 98 F | BODY MASS INDEX: 20.92 KG/M2 | OXYGEN SATURATION: 100 %

## 2023-12-30 DIAGNOSIS — R07.9 CHEST PAIN: ICD-10-CM

## 2023-12-30 DIAGNOSIS — R60.9 DEPENDENT EDEMA: Primary | ICD-10-CM

## 2023-12-30 DIAGNOSIS — R05.8 SPUTUM PRODUCTION: ICD-10-CM

## 2023-12-30 DIAGNOSIS — M79.89 LEG SWELLING: ICD-10-CM

## 2023-12-30 LAB
ALBUMIN SERPL BCP-MCNC: 3.8 G/DL (ref 3.5–5.2)
ALP SERPL-CCNC: 40 U/L (ref 55–135)
ALT SERPL W/O P-5'-P-CCNC: 15 U/L (ref 10–44)
ANION GAP SERPL CALC-SCNC: 8 MMOL/L (ref 8–16)
AST SERPL-CCNC: 19 U/L (ref 10–40)
B-HCG UR QL: NEGATIVE
BACTERIA #/AREA URNS HPF: ABNORMAL /HPF
BASOPHILS # BLD AUTO: 0.04 K/UL (ref 0–0.2)
BASOPHILS NFR BLD: 0.5 % (ref 0–1.9)
BILIRUB SERPL-MCNC: 0.4 MG/DL (ref 0.1–1)
BILIRUB UR QL STRIP: NEGATIVE
BNP SERPL-MCNC: 20 PG/ML (ref 0–99)
BUN SERPL-MCNC: 13 MG/DL (ref 6–20)
CALCIUM SERPL-MCNC: 8.8 MG/DL (ref 8.7–10.5)
CHLORIDE SERPL-SCNC: 106 MMOL/L (ref 95–110)
CLARITY UR: CLEAR
CO2 SERPL-SCNC: 23 MMOL/L (ref 23–29)
COLOR UR: YELLOW
CREAT SERPL-MCNC: 0.8 MG/DL (ref 0.5–1.4)
CTP QC/QA: YES
DIFFERENTIAL METHOD BLD: ABNORMAL
EOSINOPHIL # BLD AUTO: 0.2 K/UL (ref 0–0.5)
EOSINOPHIL NFR BLD: 2.5 % (ref 0–8)
ERYTHROCYTE [DISTWIDTH] IN BLOOD BY AUTOMATED COUNT: 12.6 % (ref 11.5–14.5)
EST. GFR  (NO RACE VARIABLE): >60 ML/MIN/1.73 M^2
GLUCOSE SERPL-MCNC: 74 MG/DL (ref 70–110)
GLUCOSE UR QL STRIP: NEGATIVE
HCG INTACT+B SERPL-ACNC: <1.2 MIU/ML
HCT VFR BLD AUTO: 37 % (ref 37–48.5)
HGB BLD-MCNC: 11.9 G/DL (ref 12–16)
HGB UR QL STRIP: ABNORMAL
IMM GRANULOCYTES # BLD AUTO: 0.02 K/UL (ref 0–0.04)
IMM GRANULOCYTES NFR BLD AUTO: 0.2 % (ref 0–0.5)
KETONES UR QL STRIP: NEGATIVE
LEUKOCYTE ESTERASE UR QL STRIP: NEGATIVE
LYMPHOCYTES # BLD AUTO: 2.6 K/UL (ref 1–4.8)
LYMPHOCYTES NFR BLD: 29.4 % (ref 18–48)
MCH RBC QN AUTO: 29 PG (ref 27–31)
MCHC RBC AUTO-ENTMCNC: 32.2 G/DL (ref 32–36)
MCV RBC AUTO: 90 FL (ref 82–98)
MICROSCOPIC COMMENT: ABNORMAL
MONOCYTES # BLD AUTO: 0.4 K/UL (ref 0.3–1)
MONOCYTES NFR BLD: 4.5 % (ref 4–15)
NEUTROPHILS # BLD AUTO: 5.6 K/UL (ref 1.8–7.7)
NEUTROPHILS NFR BLD: 62.9 % (ref 38–73)
NITRITE UR QL STRIP: NEGATIVE
NRBC BLD-RTO: 0 /100 WBC
PH UR STRIP: 7 [PH] (ref 5–8)
PLATELET # BLD AUTO: 217 K/UL (ref 150–450)
PMV BLD AUTO: 9.2 FL (ref 9.2–12.9)
POTASSIUM SERPL-SCNC: 4.1 MMOL/L (ref 3.5–5.1)
PROT SERPL-MCNC: 7 G/DL (ref 6–8.4)
PROT UR QL STRIP: NEGATIVE
RBC # BLD AUTO: 4.11 M/UL (ref 4–5.4)
RBC #/AREA URNS HPF: 42 /HPF (ref 0–4)
SODIUM SERPL-SCNC: 137 MMOL/L (ref 136–145)
SP GR UR STRIP: 1.02 (ref 1–1.03)
SQUAMOUS #/AREA URNS HPF: 1 /HPF
TROPONIN I SERPL DL<=0.01 NG/ML-MCNC: <0.006 NG/ML (ref 0–0.03)
URN SPEC COLLECT METH UR: ABNORMAL
UROBILINOGEN UR STRIP-ACNC: NEGATIVE EU/DL
WBC # BLD AUTO: 8.82 K/UL (ref 3.9–12.7)
WBC #/AREA URNS HPF: 4 /HPF (ref 0–5)

## 2023-12-30 PROCEDURE — 99285 EMERGENCY DEPT VISIT HI MDM: CPT | Mod: 25

## 2023-12-30 PROCEDURE — 80053 COMPREHEN METABOLIC PANEL: CPT | Performed by: NURSE PRACTITIONER

## 2023-12-30 PROCEDURE — 81025 URINE PREGNANCY TEST: CPT | Performed by: NURSE PRACTITIONER

## 2023-12-30 PROCEDURE — 85025 COMPLETE CBC W/AUTO DIFF WBC: CPT | Performed by: NURSE PRACTITIONER

## 2023-12-30 PROCEDURE — 93005 ELECTROCARDIOGRAM TRACING: CPT

## 2023-12-30 PROCEDURE — 84484 ASSAY OF TROPONIN QUANT: CPT | Performed by: NURSE PRACTITIONER

## 2023-12-30 PROCEDURE — 83880 ASSAY OF NATRIURETIC PEPTIDE: CPT | Performed by: NURSE PRACTITIONER

## 2023-12-30 PROCEDURE — 93010 ELECTROCARDIOGRAM REPORT: CPT | Mod: ,,, | Performed by: INTERNAL MEDICINE

## 2023-12-30 PROCEDURE — 81000 URINALYSIS NONAUTO W/SCOPE: CPT | Performed by: NURSE PRACTITIONER

## 2023-12-30 PROCEDURE — 84702 CHORIONIC GONADOTROPIN TEST: CPT | Performed by: NURSE PRACTITIONER

## 2023-12-30 NOTE — ED PROVIDER NOTES
Encounter Date: 12/30/2023       History     Chief Complaint   Patient presents with    Leg Swelling     Pt presents to ER with complaints of bi-lateral lower ext swelling since June 2023. Pt states she has mild pain on both sides of her chest (8/10). Pt states she took a water pill to help with the swelling. Pt states she has black mucus coming up. Pt believes she has an abscess and that's what's causing all of her problems.      Chief complaint:  Leg swelling    History of present illness: Patient is a 33-year-old female who reports that her bilateral lower extremity started swelling in June of 2023 she reports elevating the legs makes the swelling decreased she took a water pill which was not effective.  Current severity pain in her legs is 8/10 she reports also yesterday starting to have a feeling of heaviness in her chest.  She took no medications for this.  There were no aggravating factors no alleviating factors.  She reports a history of hypertension for which she takes an unknown medication.  She reports associated symptoms with her chest discomfort is production of sputum with black specks in it.    The history is provided by the patient. No  was used.     Review of patient's allergies indicates:  No Known Allergies  History reviewed. No pertinent past medical history.  History reviewed. No pertinent surgical history.  History reviewed. No pertinent family history.  Social History     Tobacco Use    Smoking status: Every Day     Types: Cigarettes    Smokeless tobacco: Never   Substance Use Topics    Alcohol use: Yes    Drug use: Yes     Types: Cocaine, Marijuana     Review of Systems   Constitutional:  Negative for chills, fatigue and fever.   HENT:  Negative for congestion, ear discharge, ear pain, postnasal drip, rhinorrhea, sinus pressure, sneezing, sore throat and voice change.    Eyes:  Negative for discharge and itching.   Respiratory:  Positive for cough and shortness of breath.  Negative for wheezing.    Cardiovascular:  Positive for chest pain and leg swelling. Negative for palpitations.   Gastrointestinal:  Negative for abdominal pain, constipation, diarrhea, nausea and vomiting.   Endocrine: Negative for polydipsia, polyphagia and polyuria.   Genitourinary:  Negative for dysuria, frequency, hematuria, urgency, vaginal bleeding, vaginal discharge and vaginal pain.   Musculoskeletal:  Negative for arthralgias and myalgias.   Skin:  Negative for rash and wound.   Neurological:  Positive for light-headedness. Negative for dizziness, seizures, syncope, weakness and numbness.   Hematological:  Negative for adenopathy. Does not bruise/bleed easily.   Psychiatric/Behavioral:  Negative for self-injury and suicidal ideas. The patient is not nervous/anxious.        Physical Exam     Initial Vitals [12/30/23 1607]   BP Pulse Resp Temp SpO2   133/82 88 20 97.6 °F (36.4 °C) 100 %      MAP       --         Physical Exam    Nursing note and vitals reviewed.  Constitutional: She appears well-developed and well-nourished.   HENT:   Head: Normocephalic and atraumatic.   Right Ear: External ear normal.   Left Ear: External ear normal.   Nose: Nose normal.   Eyes: Conjunctivae and EOM are normal. Pupils are equal, round, and reactive to light. Right eye exhibits no discharge. Left eye exhibits no discharge.   Neck:   Normal range of motion.  Cardiovascular:  Regular rhythm, S1 normal, S2 normal and normal heart sounds.     Exam reveals no gallop.       No murmur heard.  BLE 1+ edema nonpitting   Pulmonary/Chest: Effort normal and breath sounds normal. No respiratory distress. She has no decreased breath sounds. She has no wheezes. She has no rhonchi. She has no rales.   Abdominal: She exhibits no distension.   Musculoskeletal:         General: Normal range of motion.      Cervical back: Normal range of motion.     Neurological: She is alert and oriented to person, place, and time.   Skin: Skin is dry.  Capillary refill takes less than 2 seconds.         ED Course   Procedures  Labs Reviewed   CBC W/ AUTO DIFFERENTIAL - Abnormal; Notable for the following components:       Result Value    Hemoglobin 11.9 (*)     All other components within normal limits   COMPREHENSIVE METABOLIC PANEL - Abnormal; Notable for the following components:    Alkaline Phosphatase 40 (*)     All other components within normal limits   URINALYSIS, REFLEX TO URINE CULTURE - Abnormal; Notable for the following components:    Occult Blood UA 1+ (*)     All other components within normal limits    Narrative:     Specimen Source->Urine   URINALYSIS MICROSCOPIC - Abnormal; Notable for the following components:    RBC, UA 42 (*)     All other components within normal limits    Narrative:     Specimen Source->Urine   B-TYPE NATRIURETIC PEPTIDE   TROPONIN I   HCG, QUANTITATIVE    Narrative:     Release to patient->Immediate   POCT URINE PREGNANCY        ECG Results              EKG 12-LEAD (Final result)  Result time 12/31/23 10:20:22      Final result by Unknown User (12/31/23 10:20:22)                                      Imaging Results              X-Ray Chest PA And Lateral (Final result)  Result time 12/30/23 17:19:34      Final result by Lio Wells MD (12/30/23 17:19:34)                   Impression:      No detrimental change or radiographic acute intrathoracic process seen.      Electronically signed by: Lio Wells MD  Date:    12/30/2023  Time:    17:19               Narrative:    EXAMINATION:  XR CHEST PA AND LATERAL    CLINICAL HISTORY:  Chest pain, unspecified    TECHNIQUE:  PA and lateral views of the chest were performed.    COMPARISON:  Chest radiograph 11/22/2023, CT cervical spine 01/01/2023    FINDINGS:  Small round radiodensities related to clothing artifact overlie the chest somewhat limiting evaluation, particularly the frontal view.  EKG stickers overlie the chest.    No detrimental change.The lungs are well expanded  "and grossly clear, with normal appearance of pulmonary vasculature and no pleural effusion or pneumothorax.    The cardiac silhouette is normal in size. The hilar and mediastinal contours are unremarkable.    Bones are intact.                                       Medications - No data to display  Medical Decision Making  33-year-old female presents the emergency department for leg swelling.  It is reduced with leg elevation.  There is no pitting it is 1+ edema.  Distal P SM is intact.  Differential diagnosis includes bilateral DVT, dependent edema, renal or hepatic edema    Problems Addressed:  Chest pain: acute illness or injury     Details: Likely chest congestion, x-ray and EKG do not demonstrate causative etiology unlikely pulmonary embolus or acute coronary syndrome.  Dependent edema: acute illness or injury     Details: Elevate legs wear compression stockings    Amount and/or Complexity of Data Reviewed  Labs: ordered. Decision-making details documented in ED Course.  Radiology: ordered. Decision-making details documented in ED Course.  Discussion of management or test interpretation with external provider(s): Vital signs at the time of disposition were:  /86   Pulse 88   Temp 98.3 °F (36.8 °C)   Resp 20   Ht 5' 8" (1.727 m)   Wt 62.6 kg (138 lb)   SpO2 100%   BMI 20.98 kg/m²       See AVS for additional recommendations. Medications listed herein were prescribed after reviewing the patient's allergies, medication list, history, most recent laboratories as available.  Referrals below were provided after reviewing the patient's previous medical providers. She understands she  should return for any worsening or changes in condition.  Prior to discharge the patient was asked if she  had any additional concerns or complaints and she declined. The patient was given an opportunity to ask questions and all were answered to her satisfaction.     Risk  Diagnosis or treatment significantly limited by social " determinants of health.               ED Course as of 01/01/24 1910   Sat Dec 30, 2023   1628 BP: 133/82 [VC]   1628 Temp: 97.6 °F (36.4 °C) [VC]   1628 Temp Source: Oral [VC]   1628 Pulse: 88 [VC]   1628 SpO2: 100 % [VC]   1628 Resp: 20 [VC]   1643 Preg Test, Ur: Negative [VC]   1645 Independent EKG interpretation of the study dated December 30, 2023 at 16:41 sign by Dr. Eber San normal sinus rhythm no ectopy no ST elevation 77 ventricular rate 432 milliseconds QTC interval some T-wave flattening in V1 with an RR prime present.  Elevation of leads V2 and V3 on the ST segment no reciprocal changes. [VC]   1707 CBC auto differential(!)  Mild anemia, normal cbc otherwise. [VC]   1725 X-Ray Chest PA And Lateral    No detrimental change or radiographic acute intrathoracic process seen.    [VC]   1741 Comprehensive metabolic panel(!)  Normal cmp. [VC]   1741 BNP: 20 [VC]   1741 Troponin I: <0.006 [VC]   1741 HCG Quant: <1.2 [VC]   1741 Urinalysis, Reflex to Urine Culture Urine, Clean Catch(!)  Neg for uti. [VC]      ED Course User Index  [VC] Andrews Conner DNP                           Clinical Impression:  Final diagnoses:  [R07.9] Chest pain  [M79.89] Leg swelling  [R05.8] Sputum production  [R60.9] Dependent edema (Primary)          ED Disposition Condition    Discharge Stable          ED Prescriptions    None       Follow-up Information       Follow up With Specialties Details Why Contact Info    [facility] -  Schedule an appointment as soon as possible for a visit   [address]  [place] [address]  [phone]               Andrews Conner DNP  01/01/24 1910

## 2023-12-30 NOTE — DISCHARGE INSTRUCTIONS
Use Delsym, over the counter for cough, as directed on package.     Elevate legs whenever possible, avoid sodium.  Return to the Emergency Department for any worsening, change in condition, or any emergent concerns.

## 2024-09-07 ENCOUNTER — HOSPITAL ENCOUNTER (OUTPATIENT)
Facility: HOSPITAL | Age: 34
Discharge: LEFT BEFORE DISCHARGE | End: 2024-09-08
Attending: EMERGENCY MEDICINE | Admitting: INTERNAL MEDICINE

## 2024-09-07 DIAGNOSIS — A41.9 SEPSIS: ICD-10-CM

## 2024-09-07 DIAGNOSIS — J36 PERITONSILLAR ABSCESS: Primary | ICD-10-CM

## 2024-09-07 DIAGNOSIS — J36 TONSILLAR ABSCESS: ICD-10-CM

## 2024-09-07 DIAGNOSIS — R07.9 CHEST PAIN: ICD-10-CM

## 2024-09-07 PROBLEM — F11.10 OPIATE ABUSE, CONTINUOUS: Status: ACTIVE | Noted: 2023-09-04

## 2024-09-07 PROBLEM — N39.0 UTI (URINARY TRACT INFECTION): Status: ACTIVE | Noted: 2023-09-04

## 2024-09-07 PROBLEM — F19.959 SUBSTANCE-INDUCED PSYCHOTIC DISORDER: Status: ACTIVE | Noted: 2023-09-04

## 2024-09-07 PROBLEM — E87.6 HYPOKALEMIA DUE TO LOSS OF POTASSIUM: Status: ACTIVE | Noted: 2023-09-04

## 2024-09-07 PROBLEM — N71.9 ENDOMETRITIS: Status: ACTIVE | Noted: 2019-02-26

## 2024-09-07 PROBLEM — N73.0 PID (ACUTE PELVIC INFLAMMATORY DISEASE): Status: ACTIVE | Noted: 2019-03-01

## 2024-09-07 PROBLEM — F14.10 COCAINE ABUSE: Status: ACTIVE | Noted: 2023-09-04

## 2024-09-07 PROBLEM — S02.2XXA NASAL FRACTURE: Status: ACTIVE | Noted: 2019-10-09

## 2024-09-07 PROBLEM — N89.8 VAGINAL DISCHARGE: Status: ACTIVE | Noted: 2023-09-04

## 2024-09-07 PROBLEM — A54.9 GONORRHEA: Status: ACTIVE | Noted: 2019-03-01

## 2024-09-07 LAB
ALBUMIN SERPL BCP-MCNC: 3.2 G/DL (ref 3.5–5.2)
ALP SERPL-CCNC: 53 U/L (ref 55–135)
ALT SERPL W/O P-5'-P-CCNC: 10 U/L (ref 10–44)
ANION GAP SERPL CALC-SCNC: 12 MMOL/L (ref 8–16)
AST SERPL-CCNC: 12 U/L (ref 10–40)
B-HCG UR QL: NEGATIVE
BASOPHILS # BLD AUTO: 0.06 K/UL (ref 0–0.2)
BASOPHILS NFR BLD: 0.3 % (ref 0–1.9)
BILIRUB SERPL-MCNC: 0.7 MG/DL (ref 0.1–1)
BUN SERPL-MCNC: 10 MG/DL (ref 6–20)
CALCIUM SERPL-MCNC: 9.4 MG/DL (ref 8.7–10.5)
CHLORIDE SERPL-SCNC: 97 MMOL/L (ref 95–110)
CO2 SERPL-SCNC: 28 MMOL/L (ref 23–29)
CREAT SERPL-MCNC: 0.8 MG/DL (ref 0.5–1.4)
CTP QC/QA: YES
DIFFERENTIAL METHOD BLD: ABNORMAL
EOSINOPHIL # BLD AUTO: 0.1 K/UL (ref 0–0.5)
EOSINOPHIL NFR BLD: 0.8 % (ref 0–8)
ERYTHROCYTE [DISTWIDTH] IN BLOOD BY AUTOMATED COUNT: 12.5 % (ref 11.5–14.5)
EST. GFR  (NO RACE VARIABLE): >60 ML/MIN/1.73 M^2
GLUCOSE SERPL-MCNC: 111 MG/DL (ref 70–110)
HCT VFR BLD AUTO: 35.2 % (ref 37–48.5)
HGB BLD-MCNC: 11.6 G/DL (ref 12–16)
IMM GRANULOCYTES # BLD AUTO: 0.11 K/UL (ref 0–0.04)
IMM GRANULOCYTES NFR BLD AUTO: 0.6 % (ref 0–0.5)
LACTATE SERPL-SCNC: 0.9 MMOL/L (ref 0.5–2.2)
LYMPHOCYTES # BLD AUTO: 2.3 K/UL (ref 1–4.8)
LYMPHOCYTES NFR BLD: 12.5 % (ref 18–48)
MCH RBC QN AUTO: 29.7 PG (ref 27–31)
MCHC RBC AUTO-ENTMCNC: 33 G/DL (ref 32–36)
MCV RBC AUTO: 90 FL (ref 82–98)
MOLECULAR STREP A: NEGATIVE
MONOCYTES # BLD AUTO: 1.9 K/UL (ref 0.3–1)
MONOCYTES NFR BLD: 10.6 % (ref 4–15)
NEUTROPHILS # BLD AUTO: 13.7 K/UL (ref 1.8–7.7)
NEUTROPHILS NFR BLD: 75.2 % (ref 38–73)
NRBC BLD-RTO: 0 /100 WBC
PLATELET # BLD AUTO: 258 K/UL (ref 150–450)
PMV BLD AUTO: 9.9 FL (ref 9.2–12.9)
POTASSIUM SERPL-SCNC: 3.8 MMOL/L (ref 3.5–5.1)
PROT SERPL-MCNC: 7.6 G/DL (ref 6–8.4)
RBC # BLD AUTO: 3.9 M/UL (ref 4–5.4)
SARS-COV-2 RDRP RESP QL NAA+PROBE: NEGATIVE
SODIUM SERPL-SCNC: 137 MMOL/L (ref 136–145)
WBC # BLD AUTO: 18.18 K/UL (ref 3.9–12.7)

## 2024-09-07 PROCEDURE — 83605 ASSAY OF LACTIC ACID: CPT | Performed by: NURSE PRACTITIONER

## 2024-09-07 PROCEDURE — 99285 EMERGENCY DEPT VISIT HI MDM: CPT | Mod: 25

## 2024-09-07 PROCEDURE — 87040 BLOOD CULTURE FOR BACTERIA: CPT | Mod: 59 | Performed by: NURSE PRACTITIONER

## 2024-09-07 PROCEDURE — 96365 THER/PROPH/DIAG IV INF INIT: CPT

## 2024-09-07 PROCEDURE — 80053 COMPREHEN METABOLIC PANEL: CPT | Performed by: NURSE PRACTITIONER

## 2024-09-07 PROCEDURE — 81025 URINE PREGNANCY TEST: CPT | Performed by: NURSE PRACTITIONER

## 2024-09-07 PROCEDURE — 96361 HYDRATE IV INFUSION ADD-ON: CPT

## 2024-09-07 PROCEDURE — 25000003 PHARM REV CODE 250: Performed by: EMERGENCY MEDICINE

## 2024-09-07 PROCEDURE — 96367 TX/PROPH/DG ADDL SEQ IV INF: CPT

## 2024-09-07 PROCEDURE — 87635 SARS-COV-2 COVID-19 AMP PRB: CPT | Performed by: NURSE PRACTITIONER

## 2024-09-07 PROCEDURE — 63600175 PHARM REV CODE 636 W HCPCS: Mod: JZ,JG | Performed by: NURSE PRACTITIONER

## 2024-09-07 PROCEDURE — 96375 TX/PRO/DX INJ NEW DRUG ADDON: CPT

## 2024-09-07 PROCEDURE — 25500020 PHARM REV CODE 255: Performed by: NURSE PRACTITIONER

## 2024-09-07 PROCEDURE — 25000003 PHARM REV CODE 250: Performed by: NURSE PRACTITIONER

## 2024-09-07 PROCEDURE — 85025 COMPLETE CBC W/AUTO DIFF WBC: CPT | Performed by: NURSE PRACTITIONER

## 2024-09-07 RX ORDER — CLINDAMYCIN PHOSPHATE 600 MG/50ML
600 INJECTION, SOLUTION INTRAVENOUS
Status: COMPLETED | OUTPATIENT
Start: 2024-09-07 | End: 2024-09-07

## 2024-09-07 RX ORDER — DEXAMETHASONE SODIUM PHOSPHATE 4 MG/ML
12 INJECTION, SOLUTION INTRA-ARTICULAR; INTRALESIONAL; INTRAMUSCULAR; INTRAVENOUS; SOFT TISSUE
Status: COMPLETED | OUTPATIENT
Start: 2024-09-07 | End: 2024-09-07

## 2024-09-07 RX ORDER — KETOROLAC TROMETHAMINE 30 MG/ML
15 INJECTION, SOLUTION INTRAMUSCULAR; INTRAVENOUS
Status: COMPLETED | OUTPATIENT
Start: 2024-09-07 | End: 2024-09-07

## 2024-09-07 RX ORDER — TOPICAL ANESTHETIC 200 MG/ML
SPRAY DENTAL; PERIODONTAL ONCE
Status: COMPLETED | OUTPATIENT
Start: 2024-09-08 | End: 2024-09-08

## 2024-09-07 RX ADMIN — CEFTRIAXONE 2 G: 2 INJECTION, POWDER, FOR SOLUTION INTRAMUSCULAR; INTRAVENOUS at 02:09

## 2024-09-07 RX ADMIN — IOHEXOL 75 ML: 350 INJECTION, SOLUTION INTRAVENOUS at 01:09

## 2024-09-07 RX ADMIN — DEXAMETHASONE SODIUM PHOSPHATE 12 MG: 4 INJECTION INTRA-ARTICULAR; INTRALESIONAL; INTRAMUSCULAR; INTRAVENOUS; SOFT TISSUE at 12:09

## 2024-09-07 RX ADMIN — KETOROLAC TROMETHAMINE 15 MG: 30 INJECTION, SOLUTION INTRAMUSCULAR at 12:09

## 2024-09-07 RX ADMIN — SODIUM CHLORIDE 1000 ML: 9 INJECTION, SOLUTION INTRAVENOUS at 11:09

## 2024-09-07 RX ADMIN — SODIUM CHLORIDE 1000 ML: 9 INJECTION, SOLUTION INTRAVENOUS at 12:09

## 2024-09-07 RX ADMIN — CLINDAMYCIN PHOSPHATE 600 MG: 600 INJECTION, SOLUTION INTRAVENOUS at 03:09

## 2024-09-07 NOTE — ED PROVIDER NOTES
Encounter Date: 9/7/2024    SCRIBE #1 NOTE: I, Kristel Merlene, am scribing for, and in the presence of,  Igor Gomez NP.       History     Chief Complaint   Patient presents with    Sore Throat     Pt to ED c/o sore throat x's 2 days. Reports last night felt throat swelling. Pt is spitting in triage and reports voice is altered.      33 yo F w/ PMHx of polysubstance abuse presenting to the ED for sore throat x 2 days. She also reports subjective fever and muffled voice. No other exacerbating or alleviating factors. Denies myalgias, or other associated symptoms. No attempted tx.     The history is provided by the patient.     Review of patient's allergies indicates:  No Known Allergies  History reviewed. No pertinent past medical history.  History reviewed. No pertinent surgical history.  No family history on file.  Social History     Tobacco Use    Smoking status: Every Day     Types: Cigarettes    Smokeless tobacco: Never   Substance Use Topics    Alcohol use: Yes    Drug use: Yes     Types: Cocaine, Marijuana     Review of Systems   Constitutional:  Positive for fever (subjective). Negative for chills.   HENT:  Positive for sore throat and trouble swallowing. Negative for congestion and rhinorrhea.    Eyes:  Negative for visual disturbance.   Respiratory:  Negative for cough and shortness of breath.    Cardiovascular:  Negative for chest pain.   Gastrointestinal:  Negative for abdominal pain, diarrhea, nausea and vomiting.   Genitourinary:  Negative for dysuria, frequency and hematuria.   Musculoskeletal:  Negative for back pain.   Skin:  Negative for rash.   Neurological:  Negative for dizziness, weakness and headaches.       Physical Exam     Initial Vitals [09/07/24 1128]   BP Pulse Resp Temp SpO2   (!) 152/85 109 16 99.8 °F (37.7 °C) 98 %      MAP       --         Physical Exam    Nursing note and vitals reviewed.  Constitutional: She appears well-developed and well-nourished.   HENT:   Head: Normocephalic and  atraumatic.   Right Ear: External ear normal.   Left Ear: External ear normal.   Nose: Nose normal.   There is posterior oropharyngeal erythema with tonsillar enlargement.  Exam limited by trismus.  Hot potato voice present.  Patient is tolerating secretions.  No stridor or drooling.  No sublingual swelling or tenderness.   Eyes: Conjunctivae and EOM are normal. Right eye exhibits no discharge. Left eye exhibits no discharge.   Neck: Neck supple. No tracheal deviation present.   Normal range of motion.  Cardiovascular:  Normal rate.           Pulmonary/Chest: No stridor. No respiratory distress.   Abdominal: Abdomen is soft. She exhibits no distension. There is no abdominal tenderness.   Musculoskeletal:         General: No tenderness. Normal range of motion.      Cervical back: Normal range of motion and neck supple.     Neurological: She is alert and oriented to person, place, and time. She has normal strength. No cranial nerve deficit or sensory deficit.   Skin: Skin is warm and dry.   Psychiatric: She has a normal mood and affect. Her behavior is normal. Judgment and thought content normal.         ED Course   Procedures  Labs Reviewed   CBC W/ AUTO DIFFERENTIAL - Abnormal       Result Value    WBC 18.18 (*)     RBC 3.90 (*)     Hemoglobin 11.6 (*)     Hematocrit 35.2 (*)     MCV 90      MCH 29.7      MCHC 33.0      RDW 12.5      Platelets 258      MPV 9.9      Immature Granulocytes 0.6 (*)     Gran # (ANC) 13.7 (*)     Immature Grans (Abs) 0.11 (*)     Lymph # 2.3      Mono # 1.9 (*)     Eos # 0.1      Baso # 0.06      nRBC 0      Gran % 75.2 (*)     Lymph % 12.5 (*)     Mono % 10.6      Eosinophil % 0.8      Basophil % 0.3      Differential Method Automated     COMPREHENSIVE METABOLIC PANEL - Abnormal    Sodium 137      Potassium 3.8      Chloride 97      CO2 28      Glucose 111 (*)     BUN 10      Creatinine 0.8      Calcium 9.4      Total Protein 7.6      Albumin 3.2 (*)     Total Bilirubin 0.7      Alkaline  Phosphatase 53 (*)     AST 12      ALT 10      eGFR >60      Anion Gap 12     CULTURE, BLOOD   CULTURE, BLOOD   LACTIC ACID, PLASMA   POCT STREP A MOLECULAR    Molecular Strep A, POC Negative       Acceptable Yes     POCT URINE PREGNANCY    POC Preg Test, Ur Negative       Acceptable Yes     SARS-COV-2 RDRP GENE    POC Rapid COVID Negative       Acceptable Yes            Imaging Results               CT Soft Tissue Neck With Contrast (Final result)  Result time 09/07/24 13:48:06      Final result by Sunil Garcia MD (09/07/24 13:48:06)                   Impression:      1. Acute bilateral palatine tonsillitis with findings suggesting, and tonsillar and peritonsillar abscesses, right larger than left, as described.  There is resultant effacement of the nasopharyngeal airway.  The oropharyngeal airway appears narrowed, but patent at the present time.  2. Elsewhere, retropharyngeal fluid attenuation without discrete encapsulation is noted, extending caudally to at least the level of the aryepiglottic folds. No definite inflammatory changes in the mediastinum.  While findings could relate to reactive edema and no definite retropharyngeal abscess is appreciated at the present time, close clinical and/or imaging follow-up would be recommended, as early abscess or phlegmon formation is not excluded.  3. Presumed reactive cervical adenopathy as well as enlargement of the adenoids.  4. Additional details, as provided in the body of report.  This report was flagged in Epic as abnormal.      Electronically signed by: Sunil Garcia  Date:    09/07/2024  Time:    13:48               Narrative:    EXAMINATION:  CT SOFT TISSUE NECK WITH CONTRAST    CLINICAL HISTORY:  Epiglottitis or tonsillitis suspected;Neck abscess, deep tissue;    TECHNIQUE:  Low dose axial images as well as sagittal and coronal reconstructions were performed from the skull base to the clavicles following the  intravenous administration of 75 mL of Omnipaque 350.    COMPARISON:  Neck radiograph performed 07/09/2022.    FINDINGS:  Treatment changes: No surgical or radiation changes are evident.    Mucosal space:    There is marked enlargement of bilateral palatine tonsils with a striated enhancing appearance in keeping with acute tonsillitis.    Additionally, there appear to be bilateral, right larger than left, tonsillar and peritonsillar abscesses.  Collection on the right measures on the order of 3.1 x 2.9 x 4.2 cm with suggested.  Nodular extension anteriorly (axial series 2, image 55).    Collection on the left measures on the order of 2.8 x 1.7 x 3.3 cm with peritonsillar extension suggested at the anterior superior aspect (sagittal reformat series 602, image 100).  There is extensive edema in the surrounding parapharyngeal soft tissues.    Additionally, there is presumed reactive enlargement of soft palate and uvula as well as lymphoid tissue of the adenoids.  Results in effacement of the nasopharyngeal airway.    Oropharyngeal airway appears narrowed but patent.  Retained secretions are noted in the nasopharynx and nasal cavity.    Elsewhere, retropharyngeal fluid attenuation without discrete encapsulation is noted, extending caudally to at least the level of the aryepiglottic folds.  No definite inflammatory changes in the mediastinum.    Skull base: No definite abnormality.    Lymph nodes: Prominent number and mildly enlarged and enhancing upper mid cervical lymph nodes, presumed reactive to the above.    Parotid and submandibular glands: No focal lesions are identified.    Thyroid: The thyroid lobes are normal.    Vascular structures: The major vascular structures in the neck are patent.    Orbits: Normal.    Visualized intracranial contents: Unremarkable within the limitations of this exam.    Paranasal sinuses: Scattered paranasal sinus mucosal thickening with retention cysts.  Remote left orbital floor  fracture with herniation of extraconal fat somewhat abnormal globular appearance of the left inferior rectus muscle.  Chronic nasal bone fracture with involvement of the left frontal process of the maxilla.    Bones: Relatively modest degenerative change in the spine.    Lung apices: No definite acute abnormality.  Minor apical blebs.    Other findings:                                       Medications   clindamycin in D5W 600 mg/50 mL IVPB 600 mg (has no administration in time range)   cefTRIAXone (ROCEPHIN) 2 g in D5W 100 mL IVPB (MB+) (2 g Intravenous New Bag 9/7/24 1452)   sodium chloride 0.9% bolus 1,000 mL 1,000 mL (0 mLs Intravenous Stopped 9/7/24 1338)   ketorolac injection 15 mg (15 mg Intravenous Given 9/7/24 1233)   dexAMETHasone injection 12 mg (12 mg Intravenous Given 9/7/24 1233)   iohexoL (OMNIPAQUE 350) injection 75 mL (75 mLs Intravenous Given 9/7/24 1324)     Medical Decision Making  HPI and physical exam as above.  CBC with leukocytosis.  Blood cultures in process.  Strep swab is negative.  CMP unremarkable.  CT shows acute tonsillitis complicated by tonsillar and peritonsillar abscesses.  There is also concern for possible developing retropharyngeal abscess.  Ordered IV Decadron and IV antibiotics.  On re-evaluation patient continues to protect her airway.  There is no stridor or drooling.  Case discussed with transfer center and ENT at [facility].  Patient will be transferred for further evaluation and management by ENT.    Amount and/or Complexity of Data Reviewed  External Data Reviewed: notes.  Labs: ordered. Decision-making details documented in ED Course.  Radiology: ordered. Decision-making details documented in ED Course.    Risk  Prescription drug management.  Decision regarding hospitalization.            Scribe Attestation:   Scribe #1: I performed the above scribed service and the documentation accurately describes the services I performed. I attest to the accuracy of the  note.                           I, Igor Gomez NP, personally performed the services described in this documentation. All medical record entries made by the scribe were at my direction and in my presence. I have reviewed the chart and agree that the record reflects my personal performance and is accurate and complete.      DISCLAIMER: This note was prepared with [employer] voice recognition transcription software. Garbled syntax, mangled pronouns, and other bizarre constructions may be attributed to that software system.      Clinical Impression:  Final diagnoses:  [J36] Peritonsillar abscess (Primary)  [J36] Tonsillar abscess          ED Disposition Condition    Transfer to Another Facility Stable                Igor Gomez NP  09/07/24 1693

## 2024-09-07 NOTE — ED NOTES
Pt arrive to the ED c/o throat pain and swelling x2 days, reports she just had a nexplanon implanted in march and believe its infected. Denies taking any medications, SOB, LOC,or chest pain.

## 2024-09-07 NOTE — Clinical Note
Diagnosis: Peritonsillar abscess [475.ICD-9-CM]   Future Attending Provider: BRODERICK ABDUL [[id number]]

## 2024-09-08 VITALS
DIASTOLIC BLOOD PRESSURE: 100 MMHG | RESPIRATION RATE: 17 BRPM | WEIGHT: 130.06 LBS | HEIGHT: 67 IN | OXYGEN SATURATION: 97 % | SYSTOLIC BLOOD PRESSURE: 142 MMHG | HEART RATE: 66 BPM | BODY MASS INDEX: 20.41 KG/M2 | TEMPERATURE: 98 F

## 2024-09-08 PROBLEM — J36 PERITONSILLAR ABSCESS: Status: ACTIVE | Noted: 2024-09-08

## 2024-09-08 LAB
AMPHET+METHAMPHET UR QL: NEGATIVE
ANION GAP SERPL CALC-SCNC: 11 MMOL/L (ref 8–16)
BARBITURATES UR QL SCN>200 NG/ML: NEGATIVE
BASOPHILS # BLD AUTO: 0.03 K/UL (ref 0–0.2)
BASOPHILS NFR BLD: 0.2 % (ref 0–1.9)
BENZODIAZ UR QL SCN>200 NG/ML: NEGATIVE
BUN SERPL-MCNC: 11 MG/DL (ref 6–20)
BZE UR QL SCN: ABNORMAL
CALCIUM SERPL-MCNC: 9.1 MG/DL (ref 8.7–10.5)
CANNABINOIDS UR QL SCN: NEGATIVE
CHLORIDE SERPL-SCNC: 107 MMOL/L (ref 95–110)
CO2 SERPL-SCNC: 24 MMOL/L (ref 23–29)
CREAT SERPL-MCNC: 0.7 MG/DL (ref 0.5–1.4)
CREAT UR-MCNC: 203 MG/DL (ref 15–325)
DIFFERENTIAL METHOD BLD: ABNORMAL
EOSINOPHIL # BLD AUTO: 0 K/UL (ref 0–0.5)
EOSINOPHIL NFR BLD: 0.1 % (ref 0–8)
ERYTHROCYTE [DISTWIDTH] IN BLOOD BY AUTOMATED COUNT: 12.4 % (ref 11.5–14.5)
EST. GFR  (NO RACE VARIABLE): >60 ML/MIN/1.73 M^2
ETHANOL UR-MCNC: <10 MG/DL
GLUCOSE SERPL-MCNC: 141 MG/DL (ref 70–110)
HCT VFR BLD AUTO: 32.3 % (ref 37–48.5)
HCV AB SERPL QL IA: NORMAL
HGB BLD-MCNC: 10.2 G/DL (ref 12–16)
HIV 1+2 AB+HIV1 P24 AG SERPL QL IA: NORMAL
IMM GRANULOCYTES # BLD AUTO: 0.13 K/UL (ref 0–0.04)
IMM GRANULOCYTES NFR BLD AUTO: 0.7 % (ref 0–0.5)
LYMPHOCYTES # BLD AUTO: 1.6 K/UL (ref 1–4.8)
LYMPHOCYTES NFR BLD: 8.8 % (ref 18–48)
MCH RBC QN AUTO: 28.7 PG (ref 27–31)
MCHC RBC AUTO-ENTMCNC: 31.6 G/DL (ref 32–36)
MCV RBC AUTO: 91 FL (ref 82–98)
METHADONE UR QL SCN>300 NG/ML: NEGATIVE
MONOCYTES # BLD AUTO: 0.3 K/UL (ref 0.3–1)
MONOCYTES NFR BLD: 1.4 % (ref 4–15)
NEUTROPHILS # BLD AUTO: 16.1 K/UL (ref 1.8–7.7)
NEUTROPHILS NFR BLD: 88.8 % (ref 38–73)
NRBC BLD-RTO: 0 /100 WBC
OHS QRS DURATION: 82 MS
OHS QTC CALCULATION: 401 MS
OPIATES UR QL SCN: NEGATIVE
PCP UR QL SCN>25 NG/ML: NEGATIVE
PLATELET # BLD AUTO: 280 K/UL (ref 150–450)
PMV BLD AUTO: 10.3 FL (ref 9.2–12.9)
POCT GLUCOSE: 138 MG/DL (ref 70–110)
POTASSIUM SERPL-SCNC: 3.7 MMOL/L (ref 3.5–5.1)
RBC # BLD AUTO: 3.56 M/UL (ref 4–5.4)
SODIUM SERPL-SCNC: 142 MMOL/L (ref 136–145)
TOXICOLOGY INFORMATION: ABNORMAL
WBC # BLD AUTO: 18.15 K/UL (ref 3.9–12.7)

## 2024-09-08 PROCEDURE — 96376 TX/PRO/DX INJ SAME DRUG ADON: CPT

## 2024-09-08 PROCEDURE — G0378 HOSPITAL OBSERVATION PER HR: HCPCS

## 2024-09-08 PROCEDURE — 82962 GLUCOSE BLOOD TEST: CPT

## 2024-09-08 PROCEDURE — 99204 OFFICE O/P NEW MOD 45 MIN: CPT | Mod: ,,, | Performed by: OTOLARYNGOLOGY

## 2024-09-08 PROCEDURE — 85025 COMPLETE CBC W/AUTO DIFF WBC: CPT

## 2024-09-08 PROCEDURE — 80307 DRUG TEST PRSMV CHEM ANLYZR: CPT

## 2024-09-08 PROCEDURE — 96367 TX/PROPH/DG ADDL SEQ IV INF: CPT

## 2024-09-08 PROCEDURE — S5010 5% DEXTROSE AND 0.45% SALINE: HCPCS

## 2024-09-08 PROCEDURE — 80048 BASIC METABOLIC PNL TOTAL CA: CPT

## 2024-09-08 PROCEDURE — 93010 ELECTROCARDIOGRAM REPORT: CPT | Mod: ,,, | Performed by: INTERNAL MEDICINE

## 2024-09-08 PROCEDURE — 25000003 PHARM REV CODE 250: Performed by: EMERGENCY MEDICINE

## 2024-09-08 PROCEDURE — 93005 ELECTROCARDIOGRAM TRACING: CPT

## 2024-09-08 PROCEDURE — 25000003 PHARM REV CODE 250: Performed by: STUDENT IN AN ORGANIZED HEALTH CARE EDUCATION/TRAINING PROGRAM

## 2024-09-08 PROCEDURE — 63600175 PHARM REV CODE 636 W HCPCS

## 2024-09-08 PROCEDURE — 86803 HEPATITIS C AB TEST: CPT | Performed by: PHYSICIAN ASSISTANT

## 2024-09-08 PROCEDURE — 87389 HIV-1 AG W/HIV-1&-2 AB AG IA: CPT | Performed by: PHYSICIAN ASSISTANT

## 2024-09-08 PROCEDURE — 96365 THER/PROPH/DIAG IV INF INIT: CPT | Mod: 59

## 2024-09-08 PROCEDURE — 25000003 PHARM REV CODE 250

## 2024-09-08 PROCEDURE — 96366 THER/PROPH/DIAG IV INF ADDON: CPT

## 2024-09-08 PROCEDURE — 63600175 PHARM REV CODE 636 W HCPCS: Performed by: EMERGENCY MEDICINE

## 2024-09-08 RX ORDER — IPRATROPIUM BROMIDE AND ALBUTEROL SULFATE 2.5; .5 MG/3ML; MG/3ML
3 SOLUTION RESPIRATORY (INHALATION) EVERY 4 HOURS PRN
Status: DISCONTINUED | OUTPATIENT
Start: 2024-09-08 | End: 2024-09-08 | Stop reason: HOSPADM

## 2024-09-08 RX ORDER — CLONIDINE HYDROCHLORIDE 0.1 MG/1
0.1 TABLET ORAL EVERY 8 HOURS PRN
Status: DISCONTINUED | OUTPATIENT
Start: 2024-09-08 | End: 2024-09-08 | Stop reason: HOSPADM

## 2024-09-08 RX ORDER — IBUPROFEN 200 MG
16 TABLET ORAL
Status: DISCONTINUED | OUTPATIENT
Start: 2024-09-08 | End: 2024-09-08 | Stop reason: HOSPADM

## 2024-09-08 RX ORDER — KETOROLAC TROMETHAMINE 30 MG/ML
15 INJECTION, SOLUTION INTRAMUSCULAR; INTRAVENOUS ONCE
Status: COMPLETED | OUTPATIENT
Start: 2024-09-08 | End: 2024-09-08

## 2024-09-08 RX ORDER — GLUCAGON 1 MG
1 KIT INJECTION
Status: DISCONTINUED | OUTPATIENT
Start: 2024-09-08 | End: 2024-09-08 | Stop reason: HOSPADM

## 2024-09-08 RX ORDER — BISACODYL 10 MG/1
10 SUPPOSITORY RECTAL DAILY PRN
Status: DISCONTINUED | OUTPATIENT
Start: 2024-09-08 | End: 2024-09-08 | Stop reason: HOSPADM

## 2024-09-08 RX ORDER — IBUPROFEN 200 MG
400 TABLET ORAL DAILY PRN
COMMUNITY

## 2024-09-08 RX ORDER — MULTIVITAMIN
1 TABLET ORAL DAILY
COMMUNITY

## 2024-09-08 RX ORDER — NALOXONE HCL 0.4 MG/ML
0.02 VIAL (ML) INJECTION
Status: DISCONTINUED | OUTPATIENT
Start: 2024-09-08 | End: 2024-09-08 | Stop reason: HOSPADM

## 2024-09-08 RX ORDER — DEXAMETHASONE SODIUM PHOSPHATE 4 MG/ML
8 INJECTION, SOLUTION INTRA-ARTICULAR; INTRALESIONAL; INTRAMUSCULAR; INTRAVENOUS; SOFT TISSUE EVERY 8 HOURS
Status: DISCONTINUED | OUTPATIENT
Start: 2024-09-08 | End: 2024-09-08 | Stop reason: HOSPADM

## 2024-09-08 RX ORDER — PROMETHAZINE HYDROCHLORIDE 25 MG/1
25 TABLET ORAL EVERY 6 HOURS PRN
Status: DISCONTINUED | OUTPATIENT
Start: 2024-09-08 | End: 2024-09-08 | Stop reason: HOSPADM

## 2024-09-08 RX ORDER — TRAMADOL HYDROCHLORIDE 50 MG/1
50 TABLET ORAL EVERY 6 HOURS PRN
Status: DISCONTINUED | OUTPATIENT
Start: 2024-09-08 | End: 2024-09-08

## 2024-09-08 RX ORDER — IBUPROFEN 200 MG
24 TABLET ORAL
Status: DISCONTINUED | OUTPATIENT
Start: 2024-09-08 | End: 2024-09-08 | Stop reason: HOSPADM

## 2024-09-08 RX ORDER — ACETAMINOPHEN 500 MG
1000 TABLET ORAL EVERY 8 HOURS PRN
Status: DISCONTINUED | OUTPATIENT
Start: 2024-09-08 | End: 2024-09-08 | Stop reason: HOSPADM

## 2024-09-08 RX ORDER — HYDROXYZINE HYDROCHLORIDE 25 MG/1
50 TABLET, FILM COATED ORAL NIGHTLY PRN
Status: DISCONTINUED | OUTPATIENT
Start: 2024-09-08 | End: 2024-09-08 | Stop reason: HOSPADM

## 2024-09-08 RX ORDER — ONDANSETRON HYDROCHLORIDE 2 MG/ML
4 INJECTION, SOLUTION INTRAVENOUS EVERY 8 HOURS PRN
Status: DISCONTINUED | OUTPATIENT
Start: 2024-09-08 | End: 2024-09-08

## 2024-09-08 RX ORDER — DEXAMETHASONE SODIUM PHOSPHATE 4 MG/ML
12 INJECTION, SOLUTION INTRA-ARTICULAR; INTRALESIONAL; INTRAMUSCULAR; INTRAVENOUS; SOFT TISSUE
Status: COMPLETED | OUTPATIENT
Start: 2024-09-08 | End: 2024-09-08

## 2024-09-08 RX ORDER — TALC
6 POWDER (GRAM) TOPICAL NIGHTLY PRN
Status: DISCONTINUED | OUTPATIENT
Start: 2024-09-08 | End: 2024-09-08 | Stop reason: HOSPADM

## 2024-09-08 RX ORDER — DEXTROSE MONOHYDRATE AND SODIUM CHLORIDE 5; .45 G/100ML; G/100ML
INJECTION, SOLUTION INTRAVENOUS CONTINUOUS
Status: DISCONTINUED | OUTPATIENT
Start: 2024-09-08 | End: 2024-09-08 | Stop reason: HOSPADM

## 2024-09-08 RX ORDER — SODIUM CHLORIDE 0.9 % (FLUSH) 0.9 %
5 SYRINGE (ML) INJECTION
Status: DISCONTINUED | OUTPATIENT
Start: 2024-09-08 | End: 2024-09-08 | Stop reason: HOSPADM

## 2024-09-08 RX ORDER — LOPERAMIDE HYDROCHLORIDE 2 MG/1
4 CAPSULE ORAL 4 TIMES DAILY PRN
Status: DISCONTINUED | OUTPATIENT
Start: 2024-09-08 | End: 2024-09-08 | Stop reason: HOSPADM

## 2024-09-08 RX ORDER — CYCLOBENZAPRINE HCL 5 MG
5 TABLET ORAL EVERY 8 HOURS PRN
Status: DISCONTINUED | OUTPATIENT
Start: 2024-09-08 | End: 2024-09-08 | Stop reason: HOSPADM

## 2024-09-08 RX ADMIN — DEXTROSE AND SODIUM CHLORIDE: 5; 450 INJECTION, SOLUTION INTRAVENOUS at 01:09

## 2024-09-08 RX ADMIN — DEXAMETHASONE SODIUM PHOSPHATE 8 MG: 4 INJECTION INTRA-ARTICULAR; INTRALESIONAL; INTRAMUSCULAR; INTRAVENOUS; SOFT TISSUE at 08:09

## 2024-09-08 RX ADMIN — AMPICILLIN SODIUM AND SULBACTAM SODIUM 3 G: 2; 1 INJECTION, POWDER, FOR SOLUTION INTRAMUSCULAR; INTRAVENOUS at 06:09

## 2024-09-08 RX ADMIN — AMPICILLIN SODIUM AND SULBACTAM SODIUM 3 G: 2; 1 INJECTION, POWDER, FOR SOLUTION INTRAMUSCULAR; INTRAVENOUS at 01:09

## 2024-09-08 RX ADMIN — TOPICAL ANESTHETIC 1 EACH: 200 SPRAY DENTAL; PERIODONTAL at 12:09

## 2024-09-08 RX ADMIN — KETOROLAC TROMETHAMINE 15 MG: 30 INJECTION, SOLUTION INTRAMUSCULAR; INTRAVENOUS at 12:09

## 2024-09-08 RX ADMIN — DEXAMETHASONE SODIUM PHOSPHATE 12 MG: 4 INJECTION INTRA-ARTICULAR; INTRALESIONAL; INTRAMUSCULAR; INTRAVENOUS; SOFT TISSUE at 12:09

## 2024-09-08 RX ADMIN — AMPICILLIN SODIUM AND SULBACTAM SODIUM 3 G: 2; 1 INJECTION, POWDER, FOR SOLUTION INTRAMUSCULAR; INTRAVENOUS at 12:09

## 2024-09-08 NOTE — HOSPITAL COURSE
Patient admitted for concern for peritonsilar abscess. ENT consulted and patient was started on decadron 8mg q6h for swelling and Unasyn. Per ENT, CT scan reviewed closer with ENT staff and collections within tonsil are not rim-enchancing, possibly representing reactive edema rather than formalized abscess and drainage was deferred. Urine drug screen positive for cocaine and patient with symptoms of withdrawals. Withdrawal PRN's ordered, unfortunately patient left AMA and left prior to signing AMA paperwork.

## 2024-09-08 NOTE — ED PROVIDER NOTES
Source of History:  Patient  Chart    Chief complaint:  Transfer (Transfer from [facility] for bilateral peritonsillar abscess;drooling and hard to swallow)      HPI:  Kimberly Lr is a 34 y.o. female with history of polysubstance abuse presenting to emergency department as transfer from outside hospital for ENT evaluation of peritonsillar abscess.      She presented to outside hospital with complaint of 2 days of sore throat as well as subjective fever and muffled voice.  She had trismus on exam.  Tolerating secretions.  No stridor or drooling.  Found to have leukocytosis to 18,000. Negative urine pregnancy test.  COVID negative.  Strep negative    CT soft tissue neck with contrast showed acute bilateral palatine tonsillitis with findings suggesting tonsillar and peritonsillar abscesses, right larger than left.  There is resultant effacement of the nasopharyngeal airway.  The oropharyngeal airway appears narrowed but patent at the present time.  Elsewhere, retropharyngeal fluid attenuation without discrete encapsulation is noted, extending caudally to at least the level of the aryepiglottic folds.  No definite inflammatory changes in the mediastinum.  Well findings could relate to reactive edema and no definite retropharyngeal abscesses appreciated, close clinical and or imaging follow up would be recommended, as early abscess or phlegmon formation is not excluded.     She received Toradol, 12 mg of dexamethasone, a L of fluids, and doses of ceftriaxone 2 g, and clindamycin 600 mg.    Review of patient's allergies indicates:  No Known Allergies    No current facility-administered medications on file prior to encounter.     Current Outpatient Medications on File Prior to Encounter   Medication Sig Dispense Refill    amoxicillin-clavulanate 875-125mg (AUGMENTIN) 875-125 mg per tablet Take 1 tablet by mouth 2 (two) times daily. 14 tablet 0    erythromycin (ROMYCIN) ophthalmic ointment Place along the  wound under your left eye 3 times daily 3.5 g 0    ibuprofen (ADVIL,MOTRIN) 800 MG tablet Take 1 tablet (800 mg total) by mouth 3 (three) times daily. 20 tablet 0    oxyCODONE-acetaminophen (PERCOCET) 5-325 mg per tablet Take 1 tablet by mouth every 6 (six) hours as needed for Pain. 12 tablet 0    sucralfate (CARAFATE) 100 mg/mL suspension Take 10 mLs (1 g total) by mouth 4 (four) times daily. 420 mL 0       PMH:  As per HPI and below:  History reviewed. No pertinent past medical history.  History reviewed. No pertinent surgical history.    Social History     Socioeconomic History    Marital status: Single   Tobacco Use    Smoking status: Every Day     Types: Cigarettes    Smokeless tobacco: Never   Substance and Sexual Activity    Alcohol use: Yes    Drug use: Yes     Types: Cocaine, Marijuana    Sexual activity: Yes       No family history on file.    Physical Exam:      Vitals:    09/08/24 0011   BP:    Pulse:    Resp:    Temp: 98 °F (36.7 °C)     Gen: No acute distress.  Nontoxic.  Well appearing.  Mental Status:  Somnolent.  Arouses to painful stimuli.  Skin: Warm, dry. No rashes seen.  Eyes:  PERRL.  No miosis.  No conjunctival injection.  Pulm: CTAB. No increased work of breathing.  No significant tachypnea.  No audible stridor or wheezing.  No conversational dyspnea.    CV: Regular rate. Regular rhythm.   Abd: Soft.  Not distended.  Nontender.   MSK: Good range of motion all joints.  No deformities.    Neuro: No focal neuro deficit observed.      Laboratory Studies:  Labs Reviewed   CBC W/ AUTO DIFFERENTIAL - Abnormal       Result Value    WBC 18.18 (*)     RBC 3.90 (*)     Hemoglobin 11.6 (*)     Hematocrit 35.2 (*)     MCV 90      MCH 29.7      MCHC 33.0      RDW 12.5      Platelets 258      MPV 9.9      Immature Granulocytes 0.6 (*)     Gran # (ANC) 13.7 (*)     Immature Grans (Abs) 0.11 (*)     Lymph # 2.3      Mono # 1.9 (*)     Eos # 0.1      Baso # 0.06      nRBC 0      Gran % 75.2 (*)     Lymph %  12.5 (*)     Mono % 10.6      Eosinophil % 0.8      Basophil % 0.3      Differential Method Automated     COMPREHENSIVE METABOLIC PANEL - Abnormal    Sodium 137      Potassium 3.8      Chloride 97      CO2 28      Glucose 111 (*)     BUN 10      Creatinine 0.8      Calcium 9.4      Total Protein 7.6      Albumin 3.2 (*)     Total Bilirubin 0.7      Alkaline Phosphatase 53 (*)     AST 12      ALT 10      eGFR >60      Anion Gap 12     CULTURE, BLOOD    Blood Culture, Routine No Growth to date     CULTURE, BLOOD    Blood Culture, Routine No Growth to date     LACTIC ACID, PLASMA    Lactate (Lactic Acid) 0.9     HIV 1 / 2 ANTIBODY   HEPATITIS C ANTIBODY   POCT STREP A MOLECULAR    Molecular Strep A, POC Negative       Acceptable Yes     POCT URINE PREGNANCY    POC Preg Test, Ur Negative       Acceptable Yes     SARS-COV-2 RDRP GENE    POC Rapid COVID Negative       Acceptable Yes         Chart reviewed.     Imaging Results               CT Soft Tissue Neck With Contrast (Final result)  Result time 09/07/24 13:48:06      Final result by Sunil Garcia MD (09/07/24 13:48:06)                   Impression:      1. Acute bilateral palatine tonsillitis with findings suggesting, and tonsillar and peritonsillar abscesses, right larger than left, as described.  There is resultant effacement of the nasopharyngeal airway.  The oropharyngeal airway appears narrowed, but patent at the present time.  2. Elsewhere, retropharyngeal fluid attenuation without discrete encapsulation is noted, extending caudally to at least the level of the aryepiglottic folds. No definite inflammatory changes in the mediastinum.  While findings could relate to reactive edema and no definite retropharyngeal abscess is appreciated at the present time, close clinical and/or imaging follow-up would be recommended, as early abscess or phlegmon formation is not excluded.  3. Presumed reactive cervical  adenopathy as well as enlargement of the adenoids.  4. Additional details, as provided in the body of report.  This report was flagged in Epic as abnormal.      Electronically signed by: Sunil Garcia  Date:    09/07/2024  Time:    13:48               Narrative:    EXAMINATION:  CT SOFT TISSUE NECK WITH CONTRAST    CLINICAL HISTORY:  Epiglottitis or tonsillitis suspected;Neck abscess, deep tissue;    TECHNIQUE:  Low dose axial images as well as sagittal and coronal reconstructions were performed from the skull base to the clavicles following the intravenous administration of 75 mL of Omnipaque 350.    COMPARISON:  Neck radiograph performed 07/09/2022.    FINDINGS:  Treatment changes: No surgical or radiation changes are evident.    Mucosal space:    There is marked enlargement of bilateral palatine tonsils with a striated enhancing appearance in keeping with acute tonsillitis.    Additionally, there appear to be bilateral, right larger than left, tonsillar and peritonsillar abscesses.  Collection on the right measures on the order of 3.1 x 2.9 x 4.2 cm with suggested.  Nodular extension anteriorly (axial series 2, image 55).    Collection on the left measures on the order of 2.8 x 1.7 x 3.3 cm with peritonsillar extension suggested at the anterior superior aspect (sagittal reformat series 602, image 100).  There is extensive edema in the surrounding parapharyngeal soft tissues.    Additionally, there is presumed reactive enlargement of soft palate and uvula as well as lymphoid tissue of the adenoids.  Results in effacement of the nasopharyngeal airway.    Oropharyngeal airway appears narrowed but patent.  Retained secretions are noted in the nasopharynx and nasal cavity.    Elsewhere, retropharyngeal fluid attenuation without discrete encapsulation is noted, extending caudally to at least the level of the aryepiglottic folds.  No definite inflammatory changes in the mediastinum.    Skull base: No definite  abnormality.    Lymph nodes: Prominent number and mildly enlarged and enhancing upper mid cervical lymph nodes, presumed reactive to the above.    Parotid and submandibular glands: No focal lesions are identified.    Thyroid: The thyroid lobes are normal.    Vascular structures: The major vascular structures in the neck are patent.    Orbits: Normal.    Visualized intracranial contents: Unremarkable within the limitations of this exam.    Paranasal sinuses: Scattered paranasal sinus mucosal thickening with retention cysts.  Remote left orbital floor fracture with herniation of extraconal fat somewhat abnormal globular appearance of the left inferior rectus muscle.  Chronic nasal bone fracture with involvement of the left frontal process of the maxilla.    Bones: Relatively modest degenerative change in the spine.    Lung apices: No definite acute abnormality.  Minor apical blebs.    Other findings:                                      Medications Given:  Medications   ampicillin-sulbactam (UNASYN) 3 g in 0.9% NaCl 100 mL IVPB (MB+) (has no administration in time range)   sodium chloride 0.9% bolus 1,000 mL 1,000 mL (0 mLs Intravenous Stopped 9/7/24 1338)   ketorolac injection 15 mg (15 mg Intravenous Given 9/7/24 1233)   dexAMETHasone injection 12 mg (12 mg Intravenous Given 9/7/24 1233)   iohexoL (OMNIPAQUE 350) injection 75 mL (75 mLs Intravenous Given 9/7/24 1324)   clindamycin in D5W 600 mg/50 mL IVPB 600 mg (0 mg Intravenous Stopped 9/7/24 1600)   cefTRIAXone (ROCEPHIN) 2 g in D5W 100 mL IVPB (MB+) (0 g Intravenous Stopped 9/7/24 1521)   sodium chloride 0.9% bolus 1,000 mL 1,000 mL (0 mLs Intravenous Stopped 9/8/24 0011)   benzocaine 20 % oral spray (1 each Mouth/Throat Given 9/8/24 0015)   dexAMETHasone injection 12 mg (12 mg Intravenous Given 9/8/24 0043)       Discussed with:  ENT    MDM:    34 y.o. female with history of polysubstance abuse presenting to emergency department as transfer for bilateral  peritonsillar abscess and retropharyngeal fluid collection.  Sent here for ENT evaluation.  She was somnolent here, but protecting airway.  No opioid toxidrome.  Suspect polysubstance ingestion as possible cause of her somnolence.    To somnolent here for ENT to perform bedside I&D.  They are recommending admission to Internal Medicine with Unasyn and dexamethasone.  Repeat doses of medications given in ER.  Case discussed with hospital medicine.    Diagnostic Impression:    1. Peritonsillar abscess    2. Tonsillar abscess    3. Sepsis         ED Disposition Condition    Observation Stable                 Catalina Davidson MD  Emergency Medicine         Catalina Davidson MD  09/08/24 0108

## 2024-09-08 NOTE — HPI
34F with history of polysubstance abuse, reportedly used heroine and cocaine this morning, presented to [facility] ED with 2 days of sore throat. Reports nasal congestion/obstruction but denies issues breathing through her mouth. Reports tiredness, trismus, neck pain, odynophagia. Denies fevers or chills. Denies history of tonsil infections. Patient appears intoxicated and somnolent, but able to provide answers when awake, but falls asleep quickly during conversation.

## 2024-09-08 NOTE — ASSESSMENT & PLAN NOTE
34F with polysubstance abuse (heroine and crack use today) presents with large bilateral R>L intratonsillar abscesses and retro-oropharyngeal edema on imaging. Patient appears to be intoxicated and very somnolent, unable to obtain consent or safely perform bedside drainage at this time. Patient currently protecting her airway. WBC 18K. Blood culture pendings    -recommend medicine admit  -recommend tele/continuous pulse ox  -recommend unasyn 3g q6h, give dose now  -recommend another decadron 8mg dose in approximately 8 hours   -Presented with leukocytosis but steroids indicated to help with tonsillar swelling   -fu blood cultures, especially since IVDU  -ENT will reassess in morning, if patient is more awake and alert, will consider bedside drainage  -keep NPO for now  -call with concerns

## 2024-09-08 NOTE — PHARMACY MED REC
"Admission Medication History     The home medication history was taken by Taylor Lyon.    You may go to "Admission" then "Reconcile Home Medications" tabs to review and/or act upon these items.     The home medication list has been updated by the Pharmacy department.   Please read ALL comments highlighted in yellow.   Please address this information as you see fit.    Feel free to contact us if you have any questions or require assistance.      The medications listed below were removed from the home medication list. Please reorder if appropriate:  Patient reports no longer taking the following medication(s):  AMOXICILLIN-CLAVULANATE 875-125 MG TAB  ERYTHROMYCIN OPHT OINT  OXYCOCONE-APAP 5-325 MG  SUCRALFATE 100 MG/ML SUSP    Medications listed below were obtained from: Patient and Analytic software- "[employer]    Current Outpatient Medications on File Prior to Encounter   Medication Sig    ibuprofen (ADVIL,MOTRIN) 200 MG tablet   Take 400 mg by mouth daily as needed for Pain.    multivitamin (ONE DAILY MULTIVITAMIN) per tablet   Take 1 tablet by mouth once daily.               Taylor Lyon  EXT [id number]                  .          "

## 2024-09-08 NOTE — ED TRIAGE NOTES
Kimberly Lr, a 34 y.o. female presents to the ED w/ complaint of Peritonsillar abscess. Patient was transferred from [facility]. Patient stated she has not been able to sleep for a few days. Patient denies C/P,SOB and N/V/D.    Triage note:  Chief Complaint   Patient presents with    Transfer     Transfer from [facility] for bilateral peritonsillar abscess;drooling and hard to swallow     Review of patient's allergies indicates:  No Known Allergies  History reviewed. No pertinent past medical history.

## 2024-09-08 NOTE — ASSESSMENT & PLAN NOTE
34F with polysubstance abuse (heroine and crack use today) presents with large bilateral R>L intratonsillar abscesses and retro-oropharyngeal edema on imaging. Patient somnolent on exam, possibly intoxiciated, unable to obtain consent or safely perform bedside drainage at this time. Patient currently protecting her airway. WBC 18K. Blood culture pendings    -recommend medicine admit  -low threshold to step up if starts endorsing issues breathing  -recommend tele/continuous pulse ox  -recommend unasyn 3g q6h, give dose now  -recommend another decadron 8mg dose in approximately 8 hours   -Presented with leukocytosis but steroids indicated to help with tonsillar swelling   -fu blood cultures, especially since IVDU  -ENT will reassess in morning, if patient is more awake and alert, will consider bedside drainage  -keep NPO for now  -call with concerns

## 2024-09-08 NOTE — DISCHARGE SUMMARY
[facility] - Emergency Dept  Hospital Medicine  Discharge Summary      Patient Name: Kimberly Lr  MRN: [id number]  MICHEAL: [id number]  Patient Class: OP- Observation  Admission Date: 9/7/2024  Hospital Length of Stay: 0 days  Discharge Date and Time: 9/8/2024  1:12 PM  Attending Physician: Melinda att. providers found   Discharging Provider: Raffaele Caro MD  Primary Care Provider: Melinda, Primary Doctor  [facility] Medicine Team: [facility] HOSP MED BB Raffaele Caro MD  Primary Care Team: [facility] HOSP MED BB    HPI:   Kimberly Lr is a 34 y.o. female with a PMHx of polysubstance abuse who presents to [facility] for evaluation of sore throat. Patient reports worsening sore throat with associated muffled voice and subjective fevers for the past 2 days. Reports last night her throat began to swell. She's tolerating secretions without any shortness of breath. She has some difficulty and pain when opening her jaw. Denies neck stiffness. Patient was seen at an OSH and had a CT done which showed findings of bilateral tonsillitis with tonsillar and peritonsillar abscesses, right larger than left, as well as narrowed oropharyngeal airway; also unable to rule out early retropharyngeal abscesses. She was given Toradol, 12 mg of dexamethasone, 1L IVFs,  2 g IV rocephin, and IV clindamycin 600 mg at OSH and subsequently transferred here for ENT evaluation. She admits to snorting heroin and crack two days ago. Denies recent IVDU.    ED: mildly tachycardic to , vitals otherwise stable. Satting 100% on RA. Leukocytosis of WBC 18. Hgb stable at baseline 11.6, no electrolyte abnormalities. CT neck with findings as detailed above/below. ENT consulted; recommend dex and Unasyn; plan to re-evaluate patient and possibly perform aspiration.     * No surgery found *      Hospital Course:   Patient admitted for concern for peritonsilar abscess. ENT consulted and patient was started on decadron 8mg q6h for swelling and Unasyn. Per ENT, CT scan reviewed  closer with ENT staff and collections within tonsil are not rim-enchancing, possibly representing reactive edema rather than formalized abscess and drainage was deferred. Urine drug screen positive for cocaine and patient with symptoms of withdrawals. Withdrawal PRN's ordered, unfortunately patient left AMA and left prior to signing AMA paperwork.     Goals of Care Treatment Preferences:  Code Status: Full Code         Consults:   Consults (From admission, onward)          Status Ordering Provider     Inpatient consult to ENT  Once        Provider:  (Not yet assigned)    REHAN Grace            No new Assessment & Plan notes have been filed under this hospital service since the last note was generated.  Service: Hospital Medicine    Final Active Diagnoses:    Diagnosis Date Noted POA    PRINCIPAL PROBLEM:  Tonsillar abscess, bilateral [J36] 09/08/2024 Yes    Cocaine abuse [F14.10] 09/04/2023 Yes    Opiate abuse, continuous [F11.10] 09/04/2023 Yes      Problems Resolved During this Admission:       Discharged Condition: against medical advice    Disposition: Eloped    Follow Up:    Patient Instructions:   No discharge procedures on file.    Significant Diagnostic Studies: N/A    Pending Diagnostic Studies:       None           Medications:  None    Indwelling Lines/Drains at time of discharge:   Lines/Drains/Airways       None                   Time spent on the discharge of patient: 5 minutes         Raffaele Caro MD  Department of Hospital Medicine  [facility] - Emergency Dept

## 2024-09-08 NOTE — SUBJECTIVE & OBJECTIVE
Medications:  Continuous Infusions:  Scheduled Meds:  PRN Meds:     No current facility-administered medications on file prior to encounter.     Current Outpatient Medications on File Prior to Encounter   Medication Sig    amoxicillin-clavulanate 875-125mg (AUGMENTIN) 875-125 mg per tablet Take 1 tablet by mouth 2 (two) times daily.    erythromycin (ROMYCIN) ophthalmic ointment Place along the wound under your left eye 3 times daily    ibuprofen (ADVIL,MOTRIN) 800 MG tablet Take 1 tablet (800 mg total) by mouth 3 (three) times daily.    oxyCODONE-acetaminophen (PERCOCET) 5-325 mg per tablet Take 1 tablet by mouth every 6 (six) hours as needed for Pain.    sucralfate (CARAFATE) 100 mg/mL suspension Take 10 mLs (1 g total) by mouth 4 (four) times daily.       Review of patient's allergies indicates:  No Known Allergies    History reviewed. No pertinent past medical history.  History reviewed. No pertinent surgical history.  Family History    None       Tobacco Use    Smoking status: Every Day     Types: Cigarettes    Smokeless tobacco: Never   Substance and Sexual Activity    Alcohol use: Yes    Drug use: Yes     Types: Cocaine, Marijuana    Sexual activity: Yes     Review of Systems   Constitutional:  Positive for fatigue. Negative for chills and fever.   HENT:  Positive for congestion, sore throat, trouble swallowing and voice change. Negative for drooling.    Respiratory:  Negative for wheezing and stridor.      Objective:     Vital Signs (Most Recent):  Temp: 98 °F (36.7 °C) (09/08/24 0011)  Pulse: 78 (09/07/24 2334)  Resp: 19 (09/07/24 2334)  BP: (!) 150/98 (09/07/24 2334)  SpO2: 99 % (09/07/24 2334) Vital Signs (24h Range):  Temp:  [98 °F (36.7 °C)-99.8 °F (37.7 °C)] 98 °F (36.7 °C)  Pulse:  [] 78  Resp:  [10-19] 19  SpO2:  [98 %-100 %] 99 %  BP: (118-163)/() 150/98     Weight: 59 kg (130 lb 1.1 oz)  Body mass index is 20.37 kg/m².         Physical Exam     Somnolent, intoxicated  Normal WOB, snores  while sleeping, no stertor when awake, no stridor on room air   MMM, anterior tongue mobile, FOM soft  4+ tonsils with tan exudate, erythematous, erythematous palate, thick secretions, tonsils palpated through palate, no fluctuance   Neck soft, mildly TTP diffusely, normal ROM but tenderness with movement    Aspiration of tonsil abscesses deferred at this time due to patient's somnolence and intoxication. Unable to safely perform and unable to provide consent. Patient is currently protecting her airway.     Labs:  Recent Labs   Lab 09/07/24  1233   WBC 18.18*   RBC 3.90*   HGB 11.6*   HCT 35.2*      MCV 90   MCH 29.7   MCHC 33.0         Imaging:  CT Soft Tissue Neck With Contrast    Result Date: 9/7/2024  1. Acute bilateral palatine tonsillitis with findings suggesting, and tonsillar and peritonsillar abscesses, right larger than left, as described.  There is resultant effacement of the nasopharyngeal airway.  The oropharyngeal airway appears narrowed, but patent at the present time. 2. Elsewhere, retropharyngeal fluid attenuation without discrete encapsulation is noted, extending caudally to at least the level of the aryepiglottic folds. No definite inflammatory changes in the mediastinum.  While findings could relate to reactive edema and no definite retropharyngeal abscess is appreciated at the present time, close clinical and/or imaging follow-up would be recommended, as early abscess or phlegmon formation is not excluded. 3. Presumed reactive cervical adenopathy as well as enlargement of the adenoids. 4. Additional details, as provided in the body of report. This report was flagged in Epic as abnormal. Electronically signed by: Sunil Garcia Date:    09/07/2024 Time:    13:48

## 2024-09-08 NOTE — HPI
Kimberly Lr is a 34 y.o. female with a PMHx of polysubstance abuse who presents to [facility] for evaluation of sore throat. Patient reports worsening sore throat with associated muffled voice and subjective fevers for the past 2 days. Reports last night her throat began to swell. She's tolerating secretions without any shortness of breath. She has some difficulty and pain when opening her jaw. Denies neck stiffness. Patient was seen at an OSH and had a CT done which showed findings of bilateral tonsillitis with tonsillar and peritonsillar abscesses, right larger than left, as well as narrowed oropharyngeal airway; also unable to rule out early retropharyngeal abscesses. She was given Toradol, 12 mg of dexamethasone, 1L IVFs,  2 g IV rocephin, and IV clindamycin 600 mg at OSH and subsequently transferred here for ENT evaluation. She admits to snorting heroin and crack two days ago. Denies recent IVDU.    ED: mildly tachycardic to , vitals otherwise stable. Satting 100% on RA. Leukocytosis of WBC 18. Hgb stable at baseline 11.6, no electrolyte abnormalities. CT neck with findings as detailed above/below. ENT consulted; recommend dex and Unasyn; plan to re-evaluate patient and possibly perform aspiration.

## 2024-09-08 NOTE — PLAN OF CARE
Patient reassessed during staff rounds. Patient more awake. Tonsils now 3+ (rather than kissing). Scan reviewed closer with Staff, collections within tonsil are not rim-enchancing, possible represent reactive edema rather than formalized abscess.     -defer drainage at this time  -ok for diet  -recommend two more doses of decadron 8mg q8h today  -continue unasyn, additional abx per primary  -ent to follow    Primo Keenan MD  ENT PGY4

## 2024-09-08 NOTE — ASSESSMENT & PLAN NOTE
Opiate abuse, continuous   Reports snorting crack cocaine and heroin yesterday  - somnolent but otherwise stable on RA on eval  - monitor closely  - prn narcan  - utox

## 2024-09-08 NOTE — H&P
[facility] - Emergency Dept  [facility] Medicine  History & Physical    Patient Name: Kimberly Lr  MRN: [id number]  Patient Class: OP- Observation  Admission Date: 9/7/2024  Attending Physician: Raffaele Caro MD   Primary Care Provider: Melinda Primary Doctor         Patient information was obtained from patient, past medical records, and ER records.     Subjective:     Principal Problem:Tonsillar abscess    Chief Complaint:   Chief Complaint   Patient presents with    Transfer     Transfer from [facility] for bilateral peritonsillar abscess;drooling and hard to swallow        HPI: Kimberly Lr is a 34 y.o. female with a PMHx of polysubstance abuse who presents to [facility] for evaluation of sore throat. Patient reports worsening sore throat with associated muffled voice and subjective fevers for the past 2 days. Reports last night her throat began to swell. She's tolerating secretions without any shortness of breath. She has some difficulty and pain when opening her jaw. Denies neck stiffness. Patient was seen at an OSH and had a CT done which showed findings of bilateral tonsillitis with tonsillar and peritonsillar abscesses, right larger than left, as well as narrowed oropharyngeal airway; also unable to rule out early retropharyngeal abscesses. She was given Toradol, 12 mg of dexamethasone, 1L IVFs,  2 g IV rocephin, and IV clindamycin 600 mg at OSH and subsequently transferred here for ENT evaluation. She admits to snorting heroin and crack two days ago. Denies recent IVDU.    ED: mildly tachycardic to , vitals otherwise stable. Satting 100% on RA. Leukocytosis of WBC 18. Hgb stable at baseline 11.6, no electrolyte abnormalities. CT neck with findings as detailed above/below. ENT consulted; recommend dex and Unasyn; plan to re-evaluate patient and possibly perform aspiration.     History reviewed. No pertinent past medical history.    History reviewed. No pertinent surgical history.    Review of  patient's allergies indicates:  No Known Allergies    No current facility-administered medications on file prior to encounter.     Current Outpatient Medications on File Prior to Encounter   Medication Sig    amoxicillin-clavulanate 875-125mg (AUGMENTIN) 875-125 mg per tablet Take 1 tablet by mouth 2 (two) times daily.    erythromycin (ROMYCIN) ophthalmic ointment Place along the wound under your left eye 3 times daily    ibuprofen (ADVIL,MOTRIN) 800 MG tablet Take 1 tablet (800 mg total) by mouth 3 (three) times daily.    oxyCODONE-acetaminophen (PERCOCET) 5-325 mg per tablet Take 1 tablet by mouth every 6 (six) hours as needed for Pain.    sucralfate (CARAFATE) 100 mg/mL suspension Take 10 mLs (1 g total) by mouth 4 (four) times daily.     Family History    None       Tobacco Use    Smoking status: Every Day     Types: Cigarettes    Smokeless tobacco: Never   Substance and Sexual Activity    Alcohol use: Yes    Drug use: Yes     Types: Cocaine, Marijuana    Sexual activity: Yes     Review of Systems   Constitutional:  Positive for fever.   HENT:  Positive for sore throat. Negative for drooling and trouble swallowing.    Eyes:  Negative for visual disturbance.   Respiratory:  Negative for cough and shortness of breath.    Cardiovascular:  Negative for chest pain.   Gastrointestinal:  Negative for abdominal pain, nausea and vomiting.   Genitourinary:  Negative for dysuria.   Musculoskeletal:  Negative for gait problem.   Skin:  Negative for color change.   Neurological:  Negative for headaches.   Psychiatric/Behavioral:  Negative for agitation.      Objective:     Vital Signs (Most Recent):  Temp: 98.8 °F (37.1 °C) (09/08/24 0114)  Pulse: 76 (09/08/24 0106)  Resp: 20 (09/08/24 0106)  BP: (!) 142/81 (09/08/24 0106)  SpO2: 100 % (09/08/24 0106) Vital Signs (24h Range):  Temp:  [98 °F (36.7 °C)-99.8 °F (37.7 °C)] 98.8 °F (37.1 °C)  Pulse:  [] 76  Resp:  [10-20] 20  SpO2:  [98 %-100 %] 100 %  BP:  (118-163)/() 142/81     Weight: 59 kg (130 lb 1.1 oz)  Body mass index is 20.37 kg/m².     Physical Exam  Vitals and nursing note reviewed.   Constitutional:       General: She is not in acute distress.     Comments: Somnolent but wakes to answer questions briefly    HENT:      Head: Normocephalic and atraumatic.      Nose: Nose normal.      Mouth/Throat:      Mouth: Mucous membranes are dry.      Palate: Lesions present.      Pharynx: Oropharyngeal exudate and posterior oropharyngeal erythema present.      Tonsils: Tonsillar abscess present.   Eyes:      Pupils: Pupils are equal, round, and reactive to light.   Cardiovascular:      Rate and Rhythm: Regular rhythm.      Heart sounds: Normal heart sounds.   Pulmonary:      Effort: Pulmonary effort is normal. No respiratory distress.      Breath sounds: No stridor.   Abdominal:      Palpations: Abdomen is soft.      Tenderness: There is no abdominal tenderness.   Musculoskeletal:      Cervical back: No rigidity. Pain with movement present. Normal range of motion.      Right lower leg: No edema.      Left lower leg: No edema.   Lymphadenopathy:      Cervical: Cervical adenopathy present.   Skin:     General: Skin is warm and dry.   Neurological:      General: No focal deficit present.   Psychiatric:         Behavior: Behavior is cooperative.              CRANIAL NERVES     CN III, IV, VI   Pupils are equal, round, and reactive to light.       Significant Labs: All pertinent labs within the past 24 hours have been reviewed.  Blood Culture:   Recent Labs   Lab 09/07/24  1440 09/07/24  1446   LABBLOO No Growth to date No Growth to date     CBC:   Recent Labs   Lab 09/07/24  1233   WBC 18.18*   HGB 11.6*   HCT 35.2*        CMP:   Recent Labs   Lab 09/07/24  1233      K 3.8   CL 97   CO2 28   *   BUN 10   CREATININE 0.8   CALCIUM 9.4   PROT 7.6   ALBUMIN 3.2*   BILITOT 0.7   ALKPHOS 53*   AST 12   ALT 10   ANIONGAP 12     Lactic Acid:   Recent Labs    Lab 09/07/24  1447   LACTATE 0.9       Significant Imaging: I have reviewed all pertinent imaging results/findings within the past 24 hours.  CT Soft Tissue Neck With Contrast  Narrative: EXAMINATION:  CT SOFT TISSUE NECK WITH CONTRAST    CLINICAL HISTORY:  Epiglottitis or tonsillitis suspected;Neck abscess, deep tissue;    TECHNIQUE:  Low dose axial images as well as sagittal and coronal reconstructions were performed from the skull base to the clavicles following the intravenous administration of 75 mL of Omnipaque 350.    COMPARISON:  Neck radiograph performed 07/09/2022.    FINDINGS:  Treatment changes: No surgical or radiation changes are evident.    Mucosal space:    There is marked enlargement of bilateral palatine tonsils with a striated enhancing appearance in keeping with acute tonsillitis.    Additionally, there appear to be bilateral, right larger than left, tonsillar and peritonsillar abscesses.  Collection on the right measures on the order of 3.1 x 2.9 x 4.2 cm with suggested.  Nodular extension anteriorly (axial series 2, image 55).    Collection on the left measures on the order of 2.8 x 1.7 x 3.3 cm with peritonsillar extension suggested at the anterior superior aspect (sagittal reformat series 602, image 100).  There is extensive edema in the surrounding parapharyngeal soft tissues.    Additionally, there is presumed reactive enlargement of soft palate and uvula as well as lymphoid tissue of the adenoids.  Results in effacement of the nasopharyngeal airway.    Oropharyngeal airway appears narrowed but patent.  Retained secretions are noted in the nasopharynx and nasal cavity.    Elsewhere, retropharyngeal fluid attenuation without discrete encapsulation is noted, extending caudally to at least the level of the aryepiglottic folds.  No definite inflammatory changes in the mediastinum.    Skull base: No definite abnormality.    Lymph nodes: Prominent number and mildly enlarged and enhancing upper  mid cervical lymph nodes, presumed reactive to the above.    Parotid and submandibular glands: No focal lesions are identified.    Thyroid: The thyroid lobes are normal.    Vascular structures: The major vascular structures in the neck are patent.    Orbits: Normal.    Visualized intracranial contents: Unremarkable within the limitations of this exam.    Paranasal sinuses: Scattered paranasal sinus mucosal thickening with retention cysts.  Remote left orbital floor fracture with herniation of extraconal fat somewhat abnormal globular appearance of the left inferior rectus muscle.  Chronic nasal bone fracture with involvement of the left frontal process of the maxilla.    Bones: Relatively modest degenerative change in the spine.    Lung apices: No definite acute abnormality.  Minor apical blebs.    Other findings:  Impression: 1. Acute bilateral palatine tonsillitis with findings suggesting, and tonsillar and peritonsillar abscesses, right larger than left, as described.  There is resultant effacement of the nasopharyngeal airway.  The oropharyngeal airway appears narrowed, but patent at the present time.  2. Elsewhere, retropharyngeal fluid attenuation without discrete encapsulation is noted, extending caudally to at least the level of the aryepiglottic folds. No definite inflammatory changes in the mediastinum.  While findings could relate to reactive edema and no definite retropharyngeal abscess is appreciated at the present time, close clinical and/or imaging follow-up would be recommended, as early abscess or phlegmon formation is not excluded.  3. Presumed reactive cervical adenopathy as well as enlargement of the adenoids.  4. Additional details, as provided in the body of report.  This report was flagged in Epic as abnormal.    Electronically signed by: Sunil Garcia  Date:    09/07/2024  Time:    13:48     Assessment/Plan:     * Tonsillar abscess, bilateral  - satting 100% on RA  - no stridor, drooling, or  increased SOB  - CT soft tissue neck shows bilateral tonsillitis with tonsillar and peritonsillar abscesses with narrowed oropharyngeal but patent, unable to exclude early retropharyngeal abscess   - s/p IV dexa, clinda and rocephin at OSH  - starting dexamethasone 8mg q8h IV  - ENT consulted; plan for possible aspiration when pt less somnolent  - keep NPO  - start unasyn 3g q6h   - low threshold to add vancomycin in AM (MRSA risk with previous history IVDU)  - continuous pulse ox  - follow up blood cultures  - daily cbc     Cocaine abuse  Reports snorting crack cocaine and heroin yesterday  - somnolent but otherwise stable on RA on eval  - monitor closely  - prn narcan      VTE Risk Mitigation (From admission, onward)           Ordered     IP VTE LOW RISK PATIENT  Once         09/08/24 0107     Place sequential compression device  Until discontinued         09/08/24 0107                         On 09/08/2024, patient should be placed in hospital observation services under my care in collaboration with Regis Henley MD.           Wendie Jeffries PA-C  Department of Hospital Medicine  [facility] - Emergency Dept

## 2024-09-08 NOTE — CONSULTS
No TCM pt had surgery today 12/17/18 [facility] - Emergency Dept  Otorhinolaryngology-Head & Neck Surgery  Consult Note    Patient Name: Kimberly Lr  MRN: [id number]  Code Status: No Order  Admission Date: 9/7/2024  Hospital Length of Stay: 0 days  Attending Physician: Raffaele Caro MD  Primary Care Provider: Melinda, Primary Doctor    Patient information was obtained from patient, past medical records, and ER records.     Inpatient consult to ENT  Consult performed by: Primo Keenan MD  Consult ordered by: Catalina Davidson MD        Subjective:     Chief Complaint/Reason for Admission: tonsilliitis    History of Present Illness: 34F with history of polysubstance abuse, reportedly used heroine and cocaine this morning, presented to [facility] with 2 days of sore throat. Reports nasal congestion/obstruction but denies issues breathing through her mouth. Reports tiredness, trismus, neck pain, odynophagia. Denies fevers or chills. Denies history of tonsil infections. Patient appears intoxicated and somnolent, but able to provide answers when awake, but falls asleep quickly during conversation.     Medications:  Continuous Infusions:  Scheduled Meds:  PRN Meds:     No current facility-administered medications on file prior to encounter.     Current Outpatient Medications on File Prior to Encounter   Medication Sig    amoxicillin-clavulanate 875-125mg (AUGMENTIN) 875-125 mg per tablet Take 1 tablet by mouth 2 (two) times daily.    erythromycin (ROMYCIN) ophthalmic ointment Place along the wound under your left eye 3 times daily    ibuprofen (ADVIL,MOTRIN) 800 MG tablet Take 1 tablet (800 mg total) by mouth 3 (three) times daily.    oxyCODONE-acetaminophen (PERCOCET) 5-325 mg per tablet Take 1 tablet by mouth every 6 (six) hours as needed for Pain.    sucralfate (CARAFATE) 100 mg/mL suspension Take 10 mLs (1 g total) by mouth 4 (four) times daily.       Review of patient's allergies indicates:  No Known Allergies    History reviewed. No pertinent  past medical history.  History reviewed. No pertinent surgical history.  Family History    None       Tobacco Use    Smoking status: Every Day     Types: Cigarettes    Smokeless tobacco: Never   Substance and Sexual Activity    Alcohol use: Yes    Drug use: Yes     Types: Cocaine, Marijuana    Sexual activity: Yes     Review of Systems   Constitutional:  Positive for fatigue. Negative for chills and fever.   HENT:  Positive for congestion, sore throat, trouble swallowing and voice change. Negative for drooling.    Respiratory:  Negative for wheezing and stridor.      Objective:     Vital Signs (Most Recent):  Temp: 98 °F (36.7 °C) (09/08/24 0011)  Pulse: 78 (09/07/24 2334)  Resp: 19 (09/07/24 2334)  BP: (!) 150/98 (09/07/24 2334)  SpO2: 99 % (09/07/24 2334) Vital Signs (24h Range):  Temp:  [98 °F (36.7 °C)-99.8 °F (37.7 °C)] 98 °F (36.7 °C)  Pulse:  [] 78  Resp:  [10-19] 19  SpO2:  [98 %-100 %] 99 %  BP: (118-163)/() 150/98     Weight: 59 kg (130 lb 1.1 oz)  Body mass index is 20.37 kg/m².         Physical Exam     Somnolent, but when awake she is conversive and aware of situation  Normal WOB, intermittent stertor while sleeping but mostly silent breathing with mouth closed, no stertor when awake, no stridor while sleeping or awake on room air   MMM, anterior tongue mobile, FOM soft  4+ tonsils with tan exudate, erythematous, erythematous palate, thick secretions, tonsils palpated through palate, no fluctuance   Neck soft, mildly TTP diffusely, normal ROM but tenderness with movement    Aspiration of tonsil abscesses deferred at this time due to patient's somnolence and intoxication. Unable to safely perform and unable to provide consent. Patient is currently protecting her airway.     Labs:  Recent Labs   Lab 09/07/24  1233   WBC 18.18*   RBC 3.90*   HGB 11.6*   HCT 35.2*      MCV 90   MCH 29.7   MCHC 33.0         Imaging:  CT Soft Tissue Neck With Contrast    Result Date: 9/7/2024  1. Acute  bilateral palatine tonsillitis with findings suggesting, and tonsillar and peritonsillar abscesses, right larger than left, as described.  There is resultant effacement of the nasopharyngeal airway.  The oropharyngeal airway appears narrowed, but patent at the present time. 2. Elsewhere, retropharyngeal fluid attenuation without discrete encapsulation is noted, extending caudally to at least the level of the aryepiglottic folds. No definite inflammatory changes in the mediastinum.  While findings could relate to reactive edema and no definite retropharyngeal abscess is appreciated at the present time, close clinical and/or imaging follow-up would be recommended, as early abscess or phlegmon formation is not excluded. 3. Presumed reactive cervical adenopathy as well as enlargement of the adenoids. 4. Additional details, as provided in the body of report. This report was flagged in Epic as abnormal. Electronically signed by: Sunil Garcia Date:    09/07/2024 Time:    13:48     Assessment/Plan:     * Tonsillar abscess, bilateral  34F with polysubstance abuse (heroine and crack use today) presents with large bilateral R>L intratonsillar abscesses and retro-oropharyngeal edema on imaging. Patient somnolent on exam, possibly intoxiciated, unable to obtain consent or safely perform bedside drainage at this time. Patient currently protecting her airway. WBC 18K. Blood culture pendings    -recommend medicine admit  -low threshold to step up if starts endorsing issues breathing  -recommend tele/continuous pulse ox  -recommend unasyn 3g q6h, give dose now  -recommend another decadron 8mg dose in approximately 8 hours   -Presented with leukocytosis but steroids indicated to help with tonsillar swelling   -fu blood cultures, especially since IVDU  -ENT will reassess in morning, if patient is more awake and alert, will consider bedside drainage  -keep NPO for now  -call with concerns      VTE Risk Mitigation (From admission,  onward)      None            Thank you for your consult. I will follow-up with patient. Please contact us if you have any additional questions.    Primo Keenan MD  Otorhinolaryngology-Head & Neck Surgery  [facility] - Emergency Dept

## 2024-09-08 NOTE — ASSESSMENT & PLAN NOTE
- satting 100% on RA  - no stridor, drooling, or increased SOB  - CT soft tissue neck shows bilateral tonsillitis with tonsillar and peritonsillar abscesses with narrowed oropharyngeal but patent, unable to exclude early retropharyngeal abscess   - s/p IV dexa, clinda and rocephin at OSH  - starting dexamethasone 8mg q8h IV  - ENT consulted; plan for possible aspiration when pt less somnolent  - keep NPO  - start unasyn 3g q6h   - low threshold to add vancomycin in AM (MRSA risk with previous history IVDU)  - continuous pulse ox  - follow up blood cultures  - daily cbc

## 2024-09-08 NOTE — PLAN OF CARE
Patient seen during rounds. Still sleepy but alert when awake and participates in conversation. Reports throat feeling better. Snoring while sleeping but protecting airway and stable on room air overnight.     -still too somnolent for bedside drainage  -will reassess later this morning  -ok for sips of water for now to quench thirst, otherwise remain NPO    Primo Keenan MD  ENT PGY4

## 2024-09-08 NOTE — SUBJECTIVE & OBJECTIVE
History reviewed. No pertinent past medical history.    History reviewed. No pertinent surgical history.    Review of patient's allergies indicates:  No Known Allergies    No current facility-administered medications on file prior to encounter.     Current Outpatient Medications on File Prior to Encounter   Medication Sig    amoxicillin-clavulanate 875-125mg (AUGMENTIN) 875-125 mg per tablet Take 1 tablet by mouth 2 (two) times daily.    erythromycin (ROMYCIN) ophthalmic ointment Place along the wound under your left eye 3 times daily    ibuprofen (ADVIL,MOTRIN) 800 MG tablet Take 1 tablet (800 mg total) by mouth 3 (three) times daily.    oxyCODONE-acetaminophen (PERCOCET) 5-325 mg per tablet Take 1 tablet by mouth every 6 (six) hours as needed for Pain.    sucralfate (CARAFATE) 100 mg/mL suspension Take 10 mLs (1 g total) by mouth 4 (four) times daily.     Family History    None       Tobacco Use    Smoking status: Every Day     Types: Cigarettes    Smokeless tobacco: Never   Substance and Sexual Activity    Alcohol use: Yes    Drug use: Yes     Types: Cocaine, Marijuana    Sexual activity: Yes     Review of Systems   Constitutional:  Positive for fever.   HENT:  Positive for sore throat. Negative for drooling and trouble swallowing.    Eyes:  Negative for visual disturbance.   Respiratory:  Negative for cough and shortness of breath.    Cardiovascular:  Negative for chest pain.   Gastrointestinal:  Negative for abdominal pain, nausea and vomiting.   Genitourinary:  Negative for dysuria.   Musculoskeletal:  Negative for gait problem.   Skin:  Negative for color change.   Neurological:  Negative for headaches.   Psychiatric/Behavioral:  Negative for agitation.      Objective:     Vital Signs (Most Recent):  Temp: 98.8 °F (37.1 °C) (09/08/24 0114)  Pulse: 76 (09/08/24 0106)  Resp: 20 (09/08/24 0106)  BP: (!) 142/81 (09/08/24 0106)  SpO2: 100 % (09/08/24 0106) Vital Signs (24h Range):  Temp:  [98 °F (36.7 °C)-99.8 °F  (37.7 °C)] 98.8 °F (37.1 °C)  Pulse:  [] 76  Resp:  [10-20] 20  SpO2:  [98 %-100 %] 100 %  BP: (118-163)/() 142/81     Weight: 59 kg (130 lb 1.1 oz)  Body mass index is 20.37 kg/m².     Physical Exam  Vitals and nursing note reviewed.   Constitutional:       General: She is not in acute distress.     Comments: Somnolent but wakes to answer questions briefly    HENT:      Head: Normocephalic and atraumatic.      Nose: Nose normal.      Mouth/Throat:      Mouth: Mucous membranes are dry.      Palate: Lesions present.      Pharynx: Oropharyngeal exudate and posterior oropharyngeal erythema present.      Tonsils: Tonsillar abscess present.   Eyes:      Pupils: Pupils are equal, round, and reactive to light.   Cardiovascular:      Rate and Rhythm: Regular rhythm.      Heart sounds: Normal heart sounds.   Pulmonary:      Effort: Pulmonary effort is normal. No respiratory distress.      Breath sounds: No stridor.   Abdominal:      Palpations: Abdomen is soft.      Tenderness: There is no abdominal tenderness.   Musculoskeletal:      Cervical back: No rigidity. Pain with movement present. Normal range of motion.      Right lower leg: No edema.      Left lower leg: No edema.   Lymphadenopathy:      Cervical: Cervical adenopathy present.   Skin:     General: Skin is warm and dry.   Neurological:      General: No focal deficit present.   Psychiatric:         Behavior: Behavior is cooperative.              CRANIAL NERVES     CN III, IV, VI   Pupils are equal, round, and reactive to light.       Significant Labs: All pertinent labs within the past 24 hours have been reviewed.  Blood Culture:   Recent Labs   Lab 09/07/24  1440 09/07/24  1446   LABBLOO No Growth to date No Growth to date     CBC:   Recent Labs   Lab 09/07/24  1233   WBC 18.18*   HGB 11.6*   HCT 35.2*        CMP:   Recent Labs   Lab 09/07/24  1233      K 3.8   CL 97   CO2 28   *   BUN 10   CREATININE 0.8   CALCIUM 9.4   PROT 7.6    ALBUMIN 3.2*   BILITOT 0.7   ALKPHOS 53*   AST 12   ALT 10   ANIONGAP 12     Lactic Acid:   Recent Labs   Lab 09/07/24  1447   LACTATE 0.9       Significant Imaging: I have reviewed all pertinent imaging results/findings within the past 24 hours.  CT Soft Tissue Neck With Contrast  Narrative: EXAMINATION:  CT SOFT TISSUE NECK WITH CONTRAST    CLINICAL HISTORY:  Epiglottitis or tonsillitis suspected;Neck abscess, deep tissue;    TECHNIQUE:  Low dose axial images as well as sagittal and coronal reconstructions were performed from the skull base to the clavicles following the intravenous administration of 75 mL of Omnipaque 350.    COMPARISON:  Neck radiograph performed 07/09/2022.    FINDINGS:  Treatment changes: No surgical or radiation changes are evident.    Mucosal space:    There is marked enlargement of bilateral palatine tonsils with a striated enhancing appearance in keeping with acute tonsillitis.    Additionally, there appear to be bilateral, right larger than left, tonsillar and peritonsillar abscesses.  Collection on the right measures on the order of 3.1 x 2.9 x 4.2 cm with suggested.  Nodular extension anteriorly (axial series 2, image 55).    Collection on the left measures on the order of 2.8 x 1.7 x 3.3 cm with peritonsillar extension suggested at the anterior superior aspect (sagittal reformat series 602, image 100).  There is extensive edema in the surrounding parapharyngeal soft tissues.    Additionally, there is presumed reactive enlargement of soft palate and uvula as well as lymphoid tissue of the adenoids.  Results in effacement of the nasopharyngeal airway.    Oropharyngeal airway appears narrowed but patent.  Retained secretions are noted in the nasopharynx and nasal cavity.    Elsewhere, retropharyngeal fluid attenuation without discrete encapsulation is noted, extending caudally to at least the level of the aryepiglottic folds.  No definite inflammatory changes in the  mediastinum.    Skull base: No definite abnormality.    Lymph nodes: Prominent number and mildly enlarged and enhancing upper mid cervical lymph nodes, presumed reactive to the above.    Parotid and submandibular glands: No focal lesions are identified.    Thyroid: The thyroid lobes are normal.    Vascular structures: The major vascular structures in the neck are patent.    Orbits: Normal.    Visualized intracranial contents: Unremarkable within the limitations of this exam.    Paranasal sinuses: Scattered paranasal sinus mucosal thickening with retention cysts.  Remote left orbital floor fracture with herniation of extraconal fat somewhat abnormal globular appearance of the left inferior rectus muscle.  Chronic nasal bone fracture with involvement of the left frontal process of the maxilla.    Bones: Relatively modest degenerative change in the spine.    Lung apices: No definite acute abnormality.  Minor apical blebs.    Other findings:  Impression: 1. Acute bilateral palatine tonsillitis with findings suggesting, and tonsillar and peritonsillar abscesses, right larger than left, as described.  There is resultant effacement of the nasopharyngeal airway.  The oropharyngeal airway appears narrowed, but patent at the present time.  2. Elsewhere, retropharyngeal fluid attenuation without discrete encapsulation is noted, extending caudally to at least the level of the aryepiglottic folds. No definite inflammatory changes in the mediastinum.  While findings could relate to reactive edema and no definite retropharyngeal abscess is appreciated at the present time, close clinical and/or imaging follow-up would be recommended, as early abscess or phlegmon formation is not excluded.  3. Presumed reactive cervical adenopathy as well as enlargement of the adenoids.  4. Additional details, as provided in the body of report.  This report was flagged in [employer] as abnormal.    Electronically signed by: Sunil  Radha  Date:    09/07/2024  Time:    13:48

## 2024-09-10 LAB
BACTERIA BLD CULT: ABNORMAL

## 2024-09-11 LAB — BACTERIA BLD CULT: NORMAL

## 2025-05-09 ENCOUNTER — HOSPITAL ENCOUNTER (EMERGENCY)
Facility: HOSPITAL | Age: 35
Discharge: HOME | End: 2025-05-09
Attending: EMERGENCY MEDICINE

## 2025-05-09 VITALS
HEIGHT: 66 IN | SYSTOLIC BLOOD PRESSURE: 124 MMHG | BODY MASS INDEX: 20.89 KG/M2 | HEART RATE: 85 BPM | TEMPERATURE: 98 F | RESPIRATION RATE: 18 BRPM | DIASTOLIC BLOOD PRESSURE: 84 MMHG | OXYGEN SATURATION: 99 % | WEIGHT: 130 LBS

## 2025-05-09 DIAGNOSIS — S61.411A LACERATION OF RIGHT HAND, FOREIGN BODY PRESENCE UNSPECIFIED, INITIAL ENCOUNTER: ICD-10-CM

## 2025-05-09 DIAGNOSIS — S56.129A FLEXOR TENDON LACERATION OF FINGER WITH OPEN WOUND, INITIAL ENCOUNTER: Primary | ICD-10-CM

## 2025-05-09 DIAGNOSIS — Z59.71 DOES NOT HAVE HEALTH INSURANCE: ICD-10-CM

## 2025-05-09 DIAGNOSIS — S61.212A LACERATION OF RIGHT MIDDLE FINGER WITHOUT FOREIGN BODY WITHOUT DAMAGE TO NAIL, INITIAL ENCOUNTER: ICD-10-CM

## 2025-05-09 DIAGNOSIS — S61.209A FLEXOR TENDON LACERATION OF FINGER WITH OPEN WOUND, INITIAL ENCOUNTER: Primary | ICD-10-CM

## 2025-05-09 DIAGNOSIS — S61.214A LACERATION OF RIGHT RING FINGER WITHOUT FOREIGN BODY WITHOUT DAMAGE TO NAIL, INITIAL ENCOUNTER: ICD-10-CM

## 2025-05-09 DIAGNOSIS — S01.511A LIP LACERATION, INITIAL ENCOUNTER: ICD-10-CM

## 2025-05-09 PROCEDURE — 12011 RPR F/E/E/N/L/M 2.5 CM/<: CPT

## 2025-05-09 PROCEDURE — 90715 TDAP VACCINE 7 YRS/> IM: CPT

## 2025-05-09 PROCEDURE — 63600175 PHARM REV CODE 636 W HCPCS

## 2025-05-09 PROCEDURE — 12002 RPR S/N/AX/GEN/TRNK2.6-7.5CM: CPT

## 2025-05-09 PROCEDURE — 99284 EMERGENCY DEPT VISIT MOD MDM: CPT | Mod: 25

## 2025-05-09 PROCEDURE — 96372 THER/PROPH/DIAG INJ SC/IM: CPT

## 2025-05-09 PROCEDURE — 90471 IMMUNIZATION ADMIN: CPT

## 2025-05-09 PROCEDURE — 29125 APPL SHORT ARM SPLINT STATIC: CPT | Mod: RT

## 2025-05-09 PROCEDURE — 25000003 PHARM REV CODE 250

## 2025-05-09 RX ORDER — CEFAZOLIN SODIUM 1 G/3ML
1 INJECTION, POWDER, FOR SOLUTION INTRAMUSCULAR; INTRAVENOUS
Status: COMPLETED | OUTPATIENT
Start: 2025-05-09 | End: 2025-05-09

## 2025-05-09 RX ORDER — CEPHALEXIN 500 MG/1
500 CAPSULE ORAL 4 TIMES DAILY
Qty: 20 CAPSULE | Refills: 0 | Status: SHIPPED | OUTPATIENT
Start: 2025-05-09 | End: 2025-05-14

## 2025-05-09 RX ORDER — LIDOCAINE HYDROCHLORIDE 10 MG/ML
5 INJECTION, SOLUTION INFILTRATION; PERINEURAL
Status: COMPLETED | OUTPATIENT
Start: 2025-05-09 | End: 2025-05-09

## 2025-05-09 RX ORDER — MUPIROCIN 20 MG/G
1 OINTMENT TOPICAL
Status: COMPLETED | OUTPATIENT
Start: 2025-05-09 | End: 2025-05-09

## 2025-05-09 RX ORDER — LIDOCAINE HYDROCHLORIDE 20 MG/ML
10 INJECTION, SOLUTION INFILTRATION; PERINEURAL
Status: COMPLETED | OUTPATIENT
Start: 2025-05-09 | End: 2025-05-09

## 2025-05-09 RX ORDER — ACETAMINOPHEN 500 MG
500 TABLET ORAL EVERY 6 HOURS PRN
Qty: 30 TABLET | Refills: 0 | Status: SHIPPED | OUTPATIENT
Start: 2025-05-09

## 2025-05-09 RX ORDER — NAPROXEN 500 MG/1
500 TABLET ORAL 2 TIMES DAILY
Qty: 30 TABLET | Refills: 0 | Status: SHIPPED | OUTPATIENT
Start: 2025-05-09

## 2025-05-09 RX ADMIN — LIDOCAINE HYDROCHLORIDE 5 ML: 10 INJECTION, SOLUTION INFILTRATION; PERINEURAL at 12:05

## 2025-05-09 RX ADMIN — MUPIROCIN 1 TUBE: 20 OINTMENT TOPICAL at 12:05

## 2025-05-09 RX ADMIN — CEFAZOLIN 1 G: 330 INJECTION, POWDER, FOR SOLUTION INTRAMUSCULAR; INTRAVENOUS at 02:05

## 2025-05-09 RX ADMIN — LIDOCAINE HYDROCHLORIDE 10 ML: 20 INJECTION, SOLUTION INFILTRATION; PERINEURAL at 12:05

## 2025-05-09 RX ADMIN — CLOSTRIDIUM TETANI TOXOID ANTIGEN (FORMALDEHYDE INACTIVATED), CORYNEBACTERIUM DIPHTHERIAE TOXOID ANTIGEN (FORMALDEHYDE INACTIVATED), BORDETELLA PERTUSSIS TOXOID ANTIGEN (GLUTARALDEHYDE INACTIVATED), BORDETELLA PERTUSSIS FILAMENTOUS HEMAGGLUTININ ANTIGEN (FORMALDEHYDE INACTIVATED), BORDETELLA PERTUSSIS PERTACTIN ANTIGEN, AND BORDETELLA PERTUSSIS FIMBRIAE 2/3 ANTIGEN 0.5 ML: 5; 2; 2.5; 5; 3; 5 INJECTION, SUSPENSION INTRAMUSCULAR at 12:05

## 2025-05-09 NOTE — ED PROVIDER NOTES
"Encounter Date: 5/9/2025    SCRIBE #1 NOTE: I, Carolin Mariee, am scribing for, and in the presence of,  Anish Becerra PA-C. I have scribed the following portions of the note - Other sections scribed: HPI, ROS.       History     Chief Complaint   Patient presents with    Laceration     Pt  to ER with reports of right hand laceration and lower lip laceration after " window came down on her". Bleeding controlled in triage. Pt UTD on tetanus      35 y.o. female, with a PMHx of opiate and cocaine abuse, who presents to the ED with laceration to her right hand and lower lip sustained 2 hours PTA. Patient reports a glass window "came down on her" and notes laceration to her 2nd through 4th fingers on her right hand as well as the inside of her lower lip. States Tdap vaccination status is unknown. No other exacerbating or alleviating factors. Denies numbness, tingling, weakness, headache, dizziness, LOC, N/V, visual changes, or other  associated symptoms.    The history is provided by the patient and medical records. No  was used.     Review of patient's allergies indicates:  No Known Allergies  History reviewed. No pertinent past medical history.  History reviewed. No pertinent surgical history.  No family history on file.  Social History[1]  Review of Systems   Constitutional:  Negative for fever.   HENT:  Negative for congestion, sore throat and trouble swallowing.    Respiratory:  Negative for cough and shortness of breath.    Cardiovascular:  Negative for chest pain.   Gastrointestinal:  Negative for abdominal pain, constipation, diarrhea, nausea and vomiting.   Genitourinary:  Negative for dysuria, flank pain, frequency and urgency.   Musculoskeletal:  Positive for myalgias (right hand). Negative for arthralgias, back pain, gait problem, joint swelling, neck pain and neck stiffness.   Skin:  Positive for wound (lacerations to right hand and lower lip). Negative for rash.   Neurological:  " Negative for numbness and headaches.   All other systems reviewed and are negative.      Physical Exam     Initial Vitals [05/09/25 1116]   BP Pulse Resp Temp SpO2   (!) 146/85 105 18 98.6 °F (37 °C) 100 %      MAP       --         Physical Exam    Nursing note and vitals reviewed.  Constitutional: She appears well-developed and well-nourished. She is not diaphoretic. No distress.   HENT:   Head: Normocephalic and atraumatic. Head is without raccoon's eyes, without Pizarro's sign, without abrasion, without contusion and without laceration.   Right Ear: Tympanic membrane, external ear and ear canal normal.   Left Ear: Tympanic membrane, external ear and ear canal normal.   Nose: Nose normal. No rhinorrhea, sinus tenderness, nasal deformity, septal deviation or nasal septal hematoma. No epistaxis. Mouth/Throat: Uvula is midline, oropharynx is clear and moist and mucous membranes are normal. No trismus in the jaw. Lacerations present. No uvula swelling.    No dental fractures.  Normal jaw occlusion.  No trismus.  Tongue midline.  Tolerating oral secretions.  That has a proximally 2 cm laceration to the lower lip per image.   Eyes: Conjunctivae, EOM and lids are normal. Pupils are equal, round, and reactive to light. Right eye exhibits no discharge. Left eye exhibits no discharge.   Neck: Trachea normal. Neck supple. No tracheal tenderness present. No tracheal deviation present.   Normal range of motion.   Full passive range of motion without pain.     Cardiovascular:  Normal rate, regular rhythm, normal heart sounds, intact distal pulses and normal pulses.     Exam reveals no distant heart sounds and no friction rub.       Pulmonary/Chest: Effort normal and breath sounds normal. No respiratory distress.   Abdominal: Abdomen is soft. Bowel sounds are normal. She exhibits no distension, no pulsatile midline mass and no mass. There is no abdominal tenderness.   No right CVA tenderness.  No left CVA tenderness. There is no  rebound and no guarding.   Musculoskeletal:      Right shoulder: Normal.      Left shoulder: Normal.      Right elbow: Normal.      Left elbow: Normal.      Right wrist: Normal.      Left wrist: Normal.      Right hand: Laceration and tenderness present. No swelling or deformity. Decreased range of motion. Normal strength. Normal sensation. There is no disruption of two-point discrimination. Normal capillary refill. Normal pulse.      Left hand: Normal.      Cervical back: Normal, full passive range of motion without pain, normal range of motion and neck supple. No edema, erythema or rigidity. No spinous process tenderness or muscular tenderness. Normal range of motion.      Thoracic back: Normal.      Lumbar back: Normal.      Right hip: Normal.      Left hip: Normal.      Right knee: Normal.      Left knee: Normal.      Right lower leg: Normal.      Left lower leg: Normal.      Right ankle: Normal.      Left ankle: Normal.      Right foot: Normal.      Left foot: Normal.      Comments:   Focused exam of the right hand and wrist:  full range of motion of the wrist with 5/5 strength against resistance 2+ radial pulse.  That has full range of motion of the 1st 2nd and 5th digit with 5/5 strength with both flexion and extension.  Patient was unable to perform flexion of the 3rd and 4th digits  but it was able to perform full extension that has good cap refill of these digits.  That has a laceration overlying the flexor aspect of the 3rd and 4th digits per image.  That has an other laceration in between the 1st and 2nd digits per imaging.  Bleeding controlled.  Sensation intact.     Neurological: She is alert and oriented to person, place, and time. She has normal strength. No cranial nerve deficit or sensory deficit. GCS eye subscore is 4. GCS verbal subscore is 5. GCS motor subscore is 6.   She is awake, alert, and oriented x3. PERRL. EOM's Intact. Gross visual acuity is intact. No tongue deviation or slurred speech.  Bilateral facial movement is symmetrical. Symmetrical shoulder shrug and head moves appropriately side to side. Sensation is intact and symmetric to face, upper, and lower extremities. Patient hears commands and appropriately responds. Strength is 5/5 UE/LE bilaterally. No truncal ataxia. Ambulates with a steady gait.  Normal finger-to-nose.  Cervical Nerve Exam Normal: Equal strength with upper extremities (thumbs up/down/side, fingers spread, wrist and elbow flexion/extension, arms crossed).      Skin: Skin is warm and dry. Capillary refill takes less than 2 seconds. Laceration noted. No bruising and no ecchymosis noted. No erythema.   Psychiatric: She has a normal mood and affect. Her speech is normal and behavior is normal. Thought content normal.                   ED Course   Lac Repair    Date/Time: 5/9/2025 12:27 PM    Performed by: Anish Becerra PA-C  Authorized by: Eber San MD    Consent:     Consent obtained:  Verbal    Consent given by:  Patient    Risks, benefits, and alternatives were discussed: yes      Risks discussed:  Infection, pain, retained foreign body, need for additional repair, poor cosmetic result and poor wound healing    Alternatives discussed:  No treatment  Universal protocol:     Procedure explained and questions answered to patient or proxy's satisfaction: yes      Patient identity confirmed:  Verbally with patient  Anesthesia:     Anesthesia method:  Nerve block    Block location:  Digital 3rd and 4th digits right hand    Block needle gauge:  27 G    Block anesthetic:  Lidocaine 2% w/o epi    Block injection procedure:  Anatomic landmarks identified, introduced needle, incremental injection, anatomic landmarks palpated and negative aspiration for blood    Block outcome:  Anesthesia achieved  Laceration details:     Location:  Finger    Finger location:  R long finger    Length (cm):  2  Exploration:     Imaging obtained: x-ray    Treatment:     Area cleansed with:   Povidone-iodine and saline    Amount of cleaning:  Extensive    Irrigation solution:  Sterile saline    Irrigation volume:  2L  Skin repair:     Repair method:  Sutures    Suture size:  5-0    Suture material:  Nylon    Suture technique:  Simple interrupted    Number of sutures:  10  Approximation:     Approximation:  Loose  Repair type:     Repair type:  Intermediate  Post-procedure details:     Dressing:  Antibiotic ointment    Procedure completion:  Tolerated  Comments:      Laceration repaired with 10 sutures. 3 sutures at 2cm lac between 1st and 2nd digit with close approximation. 3rd digit 3 sutures loosely approximated. 4th digits 4 sutures loosely approximated. Topical Abx applied. Patient tolerated well without acute complication.  Instructed patient to keep the area clean and dry and watch out for signs of infection including erythema, warmth, pus discharge, and fevers at home. Instructed patient they will need to return in 7 days to have sutures between 1st and 2nd digit removed, instructed to follow up with hand surgery on Monday, they verbalized understanding.    Lac Repair    Date/Time: 5/9/2025 12:28 PM    Performed by: Anish Becerra PA-C  Authorized by: Eber San MD    Consent:     Consent obtained:  Verbal    Consent given by:  Patient    Risks, benefits, and alternatives were discussed: yes      Risks discussed:  Infection, pain, retained foreign body, need for additional repair, poor cosmetic result and poor wound healing    Alternatives discussed:  No treatment  Universal protocol:     Procedure explained and questions answered to patient or proxy's satisfaction: yes      Patient identity confirmed:  Verbally with patient  Anesthesia:     Anesthesia method:  Local infiltration    Local anesthetic:  Lidocaine 1% w/o epi  Laceration details:     Location:  Lip    Lip location:  Lower interior lip    Length (cm):  2  Treatment:     Area cleansed with:  Povidone-iodine and saline    Amount  of cleaning:  Standard  Skin repair:     Repair method:  Sutures    Suture size:  6-0    Suture material:  Fast-absorbing gut    Suture technique:  Simple interrupted and subcuticular    Number of sutures:  5  Approximation:     Approximation:  Close  Repair type:     Repair type:  Simple  Post-procedure details:     Dressing:  Open (no dressing)    Procedure completion:  Tolerated  Comments:      Laceration repaired with 5 sutures all absorbable 3 subcutaneous and 2 simple interrupted along [place] boarder. . Topical Abx applied. Patient tolerated well without acute complication.  Instructed patient to keep the area clean and dry and watch out for signs of infection including erythema, warmth, pus discharge, and fevers at home. Instructed patient they will need to   Follow up with the primary care provider in 2-3 days for wound re-evaluation, they verbalized understanding.    Splint Application    Date/Time: 2025 1:49 PM    Performed by: Anish Becerra PA-C  Authorized by: Eber San MD  Consent Done: Yes  Consent: Verbal consent obtained  Risks and benefits: risks, benefits and alternatives were discussed  Consent given by: patient  Patient identity confirmed: verbally with patient,  and MRN  Location details: right hand  Splint type: volar short arm  Supplies used: cotton padding, elastic bandage and Ortho-Glass  Post-procedure: The splinted body part was neurovascularly unchanged following the procedure.  Patient tolerance: Patient tolerated the procedure well with no immediate complications  Comments:   Posterior short arm placed in the right hand to put 3rd and 4th digits in slight flexion.        Labs Reviewed - No data to display       Imaging Results              X-Ray Hand 3 view Right (Final result)  Result time 25 12:47:16      Final result by Mikal Ovalle MD (25 12:47:16)                   Impression:      1. No acute displaced fracture or dislocation of the  "hand.      Electronically signed by: Mikal Ovalle MD  Date:    05/09/2025  Time:    12:47               Narrative:    EXAMINATION:  XR HAND COMPLETE 3 VIEW RIGHT    CLINICAL HISTORY:  multiple lacerations. rule out fracture and retained glass FB.;    TECHNIQUE:  PA, lateral, and oblique views of the right hand were performed.    COMPARISON:  Right digits 04/05/2022    FINDINGS:  Three views right hand.    No acute displaced fracture or dislocation of the hand.  No radiopaque foreign body.  No significant edema.                                       Medications   ceFAZolin injection 1 g (has no administration in time range)   LIDOcaine HCL 20 mg/ml (2%) injection 10 mL (10 mLs Infiltration Given by Other 5/9/25 1209)   LIDOcaine HCL 10 mg/ml (1%) injection 5 mL (5 mLs Infiltration Given by Other 5/9/25 1209)   mupirocin 2 % ointment 1 Tube (1 Tube Topical (Top) Given 5/9/25 1209)   Tdap vaccine injection 0.5 mL (0.5 mLs Intramuscular Given 5/9/25 1209)     Medical Decision Making  35 y.o. female, with a PMHx of opiate and cocaine abuse, who presents to the ED with laceration to her right hand and lower lip sustained 2 hours PTA. Patient reports a glass window "came down on her" and notes laceration to her 2nd through 4th fingers on her right hand as well as the inside of her lower lip. States Tdap vaccination status is unknown. No other exacerbating or alleviating factors. Denies numbness, tingling, weakness, headache, dizziness, LOC, N/V, visual changes, or other  associated symptoms.  Patient's vitals reviewed.  They are afebrile, no respiratory distress, nontoxic-appearing in the ED.  Patient's chart and medical history reviewed.  Differential diagnosis is considered the following.  - Septic Arthritis, Gout, Osteomyelitis: considered with pain, although unlikely without overlying erythema and swelling/edema, patient is afebrile  - Fracture/Dislocation: considered with pain, imaging ordered for further " evaluation  - tendon laceration: considered with   Lacerations over the middle phalanx it was on the flexor tendon aspect with patient was inability to perform finger flexion  with the ability with finger hyperextension.   Good cap refill.   - Compartment Syndrome: unlikely with 2+ distal pulses, no pallor, no paresthesias, no woody induration.   - SAH, Epidural hematoma, Subdural hematoma: considered with complaint, although unlikely with no LOC, no N/V, normal neurovascular exam, per [place] criteria and using shared decision making will not obtain imaging at this time due to low suspicion..  - Cervical/neck fracture: considered with complaint, although unlikely with no spinal tenderness, no step-off, no overlying skin changes, neurovascular exam intact, sensation intact, moving all UE/LE bilaterally without limitation.  - skull fracture, facial fracture: considered with complaint although unlikely with no raccoon eyes, martines signs, evidence of CSF leakage in nose/ears bilaterally, EOMI without pain.   Normal jaw occlusion.  No trismus.  No dental injury.  Physical exam as above.   Patient was last tetanus shot was in 2020 we will renew patient was tetanus today due to 5 years concern for dirty injury.  Patient was wounds heavily irrigated with saline.  Repair per procedure note.  Consulted Orthopedics due to concern for tendon laceration.  More per ED course.   Patient will follow up with hand surgery on Monday for tendon laceration evaluation.   Wound care instructions provided.      REHANA WILD    DISCLAIMER: [email or web address], a voice recognition system was utilized in creating the note.      Amount and/or Complexity of Data Reviewed  Radiology: ordered. Decision-making details documented in ED Course.    Risk  Prescription drug management.            Scribe Attestation:   Scribe #1: I performed the above scribed service and the documentation accurately describes the services I performed. I attest to the accuracy of  the note.        ED Course as of 05/09/25 1352   Fri May 09, 2025   1202 Patient exam suspicious for self defense wounds. In private, I have discussed in asked the patient for further contacts to how she obtain the injuries and patient was it was sticking to her story of injury from glass from a window. I informed the patient she is in a safe space and if she is comfortable to provide further context of injuries. Patient acknowledges. [AF]   1309 X-Ray Hand 3 view Right  No acute displaced fracture or dislocation of the hand.  No radiopaque foreign body.  No significant edema. [AF]   1322 Spoke with Dr. Lilly who agreed with plan of ancef in ED and dc with keflex for coverage. Also suggested placing patient is short arm posterior with fingers in slight flexion and follow up with Dr. Cantu with hand surgery.  [AF]      ED Course User Index  [AF] Anish Becerra PA-C                           Clinical Impression:  Final diagnoses:  [S01.511A] Lip laceration, initial encounter  [S61.411A] Laceration of right hand, foreign body presence unspecified, initial encounter  [S61.212A] Laceration of right middle finger without foreign body without damage to nail, initial encounter  [S61.214A] Laceration of right ring finger without foreign body without damage to nail, initial encounter  [S56.129A, S61.209A] Flexor tendon laceration of finger with open wound, initial encounter (Primary)          ED Disposition Condition    Discharge Stable          ED Prescriptions       Medication Sig Dispense Start Date End Date Auth. Provider    cephALEXin (KEFLEX) 500 MG capsule Take 1 capsule (500 mg total) by mouth 4 (four) times daily. for 5 days 20 capsule 5/9/2025 5/14/2025 Anish Becerra PA-C    naproxen (NAPROSYN) 500 MG tablet Take 1 tablet (500 mg total) by mouth 2 (two) times daily. 30 tablet 5/9/2025 -- Anish Becerra PA-C    acetaminophen (TYLENOL) 500 MG tablet Take 1 tablet (500 mg total) by mouth every 6 (six) hours as  needed for Pain. 30 tablet 5/9/2025 -- Anish Becerra PA-C          Follow-up Information       Follow up With Specialties Details Why Contact [facility] Emergency Dept Emergency Medicine Go to  If you have new or worsening symptoms, or if you have any concerns at all. [address]  [facility] [place] [id number] [phone]    Your primary care physician  Schedule an appointment as soon as possible for a visit in 1 day for follow up regarding today's visit and for re-evaluation. Please follow up in 1-2 days.     Ruel Cantu III, MD Orthopedic Surgery Call in 2 days for follow up [address] [facility]  [address]  [place] [address]  [phone]      Orthopedics, [facility] - Orthopedic Surgery   [address] [facility] [address]  [phone]            Anish NOEL PA-C, personally performed the services described in this documentation. All medical record entries made by the scribe were at my direction and in my presence. I have reviewed the chart and agree that the record reflects my personal performance and is accurate and complete.      DISCLAIMER: This note was prepared with [employer] voice recognition transcription software. Garbled syntax, mangled pronouns, and other bizarre constructions may be attributed to that software system.         [1]   Social History  Tobacco Use    Smoking status: Every Day     Types: Cigarettes    Smokeless tobacco: Never   Substance Use Topics    Alcohol use: Yes    Drug use: Yes     Types: Cocaine, Marijuana        Anish Becerra PA-C  05/09/25 4520

## 2025-05-09 NOTE — ED NOTES
Pt is noted with a lac to her lower lip. Does not state what happen.  Pt. Also has multiple lac to right hand, 3rd and 4th fingers. Pt. States she was picking up glass and it broke. Pt. Is not not forthcoming and does not want to answer questions.

## 2025-05-09 NOTE — DISCHARGE INSTRUCTIONS
Please keep your wound clean and dry.  Wash gently with soap and water and apply antibiotic ointment (bacitracin, neosporin, etc.) over the wound after washing. Please watch for signs of infection including: increased\spreading redness, swelling, pus-like discharge, or a fever greater than 100.4F. If you experience any of these, please contact your Primary Care Doctor or Return to the Emergency Department for a wound check.     Please follow up with your Primary Care Doctor in 2-3 days for wound recheck in 7-10 days for suture removal.  You may return to the Emergency Department if you are unable to see your Primary Care Doctor.  Please return to the ER for any new or worsening symptoms.    A healing laceration should not be exposed to direct sunlight because of the risk for hyperpigmentation (darkening of the skin). It is recommended that you use sunscreen on lacerations or abrasions to help prevent the development of hyperpigmentation once the wounds are healed.   Thank you for coming to our Emergency Department today. It is important to remember that some problems or medical conditions are difficult to diagnose and may not be found or addressed during your Emergency Department visit.  These conditions often start with non-specific symptoms and can only be diagnosed on follow up visits with your primary care physician or specialist when the symptoms continue or change. Please remember that all medical conditions can change, and we cannot predict how you will be feeling tomorrow or the next day. Return to the ER with any questions/concerns, new/concerning symptoms including fever, chest pain, shortness of breath, loss of consciousness, dizziness, weakness, worsening symptoms, failure to improve, or any other concerns. Also, please follow up with your Primary Care Physician and/or Pediatrician in the next 1-2 days to review your ED visit in entirety and for re-evaluation.   Be sure to follow up with your primary  care doctor and review all labs/imaging/tests that were performed during your ER visit with them. It is very common for us to identify non-emergent incidental findings which must be followed up with your primary care physician.  Some labs/imaging/tests may be outside of the normal range, and require non-emergent follow-up and/or further investigation/treatment/procedures/testing to help diagnose/exclude/prevent complications or other potentially serious medical conditions. Some abnormalities may not have been discussed or addressed during your ER visit. Some lab results may not return during your ER visit but can be accessible by downloading the free [facility] Mychart priscilla or by visiting [email or web address] . It is important for you to review all labs/imaging/tests which are outside of the normal range with your physician.  An ER visit does not replace a primary care visit, and many screening tests or follow-up tests cannot be ordered by an ER doctor or performed by the ER. Some tests may even require pre-approval.  If you do not have a primary care doctor, you may contact the one listed on your discharge paperwork or you may also call the [email or web address] Appointment Desk at [phone] , or [employer] at  [phone] to schedule an appointment, or establish care with a primary care doctor or even a specialist and to obtain information about local resources. It is important to your health that you have a primary care doctor.  Please take all medications as directed. We have done our best to select a medication for you that will treat your condition however, all medications may potentially have side-effects and it is impossible to predict which medications may give you side-effects or what those side-effects (if any) those medications may give you.  If you feel that you are having a negative effect or side-effect of any medication you should stop taking those medications immediately and seek medical attention.  If you feel that you are having a life-threatening reaction call 911.  Do not drive, swim, climb to height, take a bath, operate heavy machinery, drink alcohol or take potentially sedating medications, sign any legal documents or make any important decisions for 24 hours if you have received any pain medications, sedatives or mood altering drugs during your ER visit or within 24 hours of taking them if they have been prescribed to you.   You can find additional resources for Dentists, hearing aids, durable medical equipment, low cost pharmacies and other resources at [email or web address]  Patient agrees with this plan. Discussed with her strict return precautions, they verbalized understanding. Patient is stable for discharge.   § Please take all medication as prescribed.

## 2025-05-09 NOTE — ED NOTES
I have reviewed the provider's instructions with the patient, answering all questions to her satisfaction. Pt. Reports she needs 10 min before getting d/c.